# Patient Record
Sex: MALE | Race: ASIAN | NOT HISPANIC OR LATINO
[De-identification: names, ages, dates, MRNs, and addresses within clinical notes are randomized per-mention and may not be internally consistent; named-entity substitution may affect disease eponyms.]

---

## 2017-01-10 ENCOUNTER — APPOINTMENT (OUTPATIENT)
Dept: UROLOGY | Facility: CLINIC | Age: 77
End: 2017-01-10

## 2017-01-10 VITALS
HEART RATE: 61 BPM | HEIGHT: 68 IN | WEIGHT: 151 LBS | SYSTOLIC BLOOD PRESSURE: 144 MMHG | BODY MASS INDEX: 22.88 KG/M2 | DIASTOLIC BLOOD PRESSURE: 73 MMHG | TEMPERATURE: 99 F | OXYGEN SATURATION: 97 % | RESPIRATION RATE: 61 BRPM

## 2017-05-30 ENCOUNTER — APPOINTMENT (OUTPATIENT)
Dept: UROLOGY | Facility: CLINIC | Age: 77
End: 2017-05-30

## 2017-05-30 VITALS
WEIGHT: 142 LBS | SYSTOLIC BLOOD PRESSURE: 147 MMHG | TEMPERATURE: 98 F | HEART RATE: 59 BPM | DIASTOLIC BLOOD PRESSURE: 82 MMHG | HEIGHT: 68 IN | OXYGEN SATURATION: 97 % | RESPIRATION RATE: 17 BRPM | BODY MASS INDEX: 21.52 KG/M2

## 2017-06-06 ENCOUNTER — EMERGENCY (EMERGENCY)
Facility: HOSPITAL | Age: 77
LOS: 1 days | Discharge: HOME | End: 2017-06-06
Attending: EMERGENCY MEDICINE
Payer: MEDICARE

## 2017-06-06 VITALS
HEART RATE: 77 BPM | OXYGEN SATURATION: 98 % | TEMPERATURE: 98 F | SYSTOLIC BLOOD PRESSURE: 133 MMHG | RESPIRATION RATE: 18 BRPM | DIASTOLIC BLOOD PRESSURE: 63 MMHG

## 2017-06-06 VITALS
TEMPERATURE: 98 F | SYSTOLIC BLOOD PRESSURE: 148 MMHG | DIASTOLIC BLOOD PRESSURE: 75 MMHG | HEART RATE: 78 BPM | HEIGHT: 66 IN | OXYGEN SATURATION: 96 % | RESPIRATION RATE: 18 BRPM | WEIGHT: 141.1 LBS

## 2017-06-06 DIAGNOSIS — I10 ESSENTIAL (PRIMARY) HYPERTENSION: ICD-10-CM

## 2017-06-06 DIAGNOSIS — R33.9 RETENTION OF URINE, UNSPECIFIED: ICD-10-CM

## 2017-06-06 DIAGNOSIS — Z88.0 ALLERGY STATUS TO PENICILLIN: ICD-10-CM

## 2017-06-06 LAB
ANION GAP SERPL CALC-SCNC: 17 MMOL/L
APPEARANCE UR: CLEAR — SIGNIFICANT CHANGE UP
APPEARANCE: CLEAR
BACTERIA # UR AUTO: ABNORMAL /HPF
BACTERIA UR CULT: NORMAL
BACTERIA: NEGATIVE
BILIRUB UR-MCNC: NEGATIVE — SIGNIFICANT CHANGE UP
BILIRUBIN URINE: NEGATIVE
BLOOD URINE: ABNORMAL
BUN SERPL-MCNC: 18 MG/DL
CALCIUM SERPL-MCNC: 9.8 MG/DL
CHLORIDE SERPL-SCNC: 101 MMOL/L
CO2 SERPL-SCNC: 22 MMOL/L
COLOR SPEC: YELLOW — SIGNIFICANT CHANGE UP
COLOR: YELLOW
CORE LAB FLUID CYTOLOGY: NORMAL
CREAT SERPL-MCNC: 0.97 MG/DL
DIFF PNL FLD: ABNORMAL
GLUCOSE QUALITATIVE U: NORMAL MG/DL
GLUCOSE SERPL-MCNC: 97 MG/DL
GLUCOSE UR QL: NEGATIVE — SIGNIFICANT CHANGE UP
HYALINE CASTS: 2 /LPF
KETONES UR-MCNC: NEGATIVE — SIGNIFICANT CHANGE UP
KETONES URINE: NEGATIVE
LEUKOCYTE ESTERASE UR-ACNC: ABNORMAL
LEUKOCYTE ESTERASE URINE: ABNORMAL
MICROSCOPIC-UA: NORMAL
NITRITE UR-MCNC: NEGATIVE — SIGNIFICANT CHANGE UP
NITRITE URINE: NEGATIVE
PH UR: 6.5 — SIGNIFICANT CHANGE UP (ref 5–8)
PH URINE: 6.5
POTASSIUM SERPL-SCNC: 4.4 MMOL/L
PROT UR-MCNC: NEGATIVE — SIGNIFICANT CHANGE UP
PROTEIN URINE: NEGATIVE MG/DL
RBC CASTS # UR COMP ASSIST: ABNORMAL /HPF (ref 0–2)
RED BLOOD CELLS URINE: 5 /HPF
SODIUM SERPL-SCNC: 140 MMOL/L
SP GR SPEC: 1 — LOW (ref 1.01–1.02)
SPECIFIC GRAVITY URINE: 1.02
SQUAMOUS EPITHELIAL CELLS: 2 /HPF
UROBILINOGEN FLD QL: NEGATIVE — SIGNIFICANT CHANGE UP
UROBILINOGEN URINE: NORMAL MG/DL
WBC UR QL: SIGNIFICANT CHANGE UP /HPF (ref 0–5)
WHITE BLOOD CELLS URINE: 15 /HPF

## 2017-06-06 PROCEDURE — 99283 EMERGENCY DEPT VISIT LOW MDM: CPT | Mod: 25

## 2017-06-06 PROCEDURE — 81001 URINALYSIS AUTO W/SCOPE: CPT

## 2017-06-06 PROCEDURE — 51703 INSERT BLADDER CATH COMPLEX: CPT

## 2017-06-06 PROCEDURE — 99284 EMERGENCY DEPT VISIT MOD MDM: CPT | Mod: 25

## 2017-06-06 RX ORDER — TAMSULOSIN HYDROCHLORIDE 0.4 MG/1
1 CAPSULE ORAL
Qty: 7 | Refills: 0
Start: 2017-06-06 | End: 2017-06-13

## 2017-06-06 NOTE — ED ADULT NURSE NOTE - CHIEF COMPLAINT
[facility] RASHAWN  Lupe Martin    [address] [place] [address]    Phone:  [phone]    Fax:  [phone]           Patricio Pollock   MRN: [id number]    Department:  THE [facility]   Date of Visit: The patient is a 77y Male complaining of urinary/bladder trouble. [facility]  [address] [facility] [place], [address]  [phone] [facility] [address]  [address], [address]  [phone] [facility]  [address] [facility], [facility] Proc[address] Gaffney Silas 1   [phone] re-interpreted by a radiologist.  If there is a significant change in your reading, you will be contacted. Please make sure we have your correct phone number before you leave. After you leave, you should follow the attached instructions.      I have read an and ask to get set up for an insurance coverage that is in-network with [employer].         Imaging Results         XR ELBOW, COMPLETE (MIN 3 VIEWS), RIGHT (CPT=73080) (Final result) Result time:  02/25/17 12:04:38    Final result    Impression: Your unique Ghostery Access Code: [email or web address]  Expires: 4/26/2017 12:43 PM    If you have questions, you can call [phone] to talk to our [facility] Staff. Remember, Ghostery is NOT to be used for urgent needs. For medical emergencies, dial 911.

## 2017-06-06 NOTE — ED ADULT NURSE NOTE - ED STAT RN HANDOFF DETAILS 2
pt.is alert and  oriented x3.denies  pain. discharged  instruction given via   and  verbalized  understanding with chambers cath intact,left  in the ed  in stable  condition.not   in distress

## 2017-06-06 NOTE — ED PROVIDER NOTE - OBJECTIVE STATEMENT
76 y/o male with PMHx of BPH and HTN presents to the ED c/o urinary retention today. 2 weeks ago, pt noticed his urine was red. He went to his Urologist (Dr. Hemal Duque) who told him to get a CT abdomen/pelvis. Pt was unable to urinate before his CT scan this morning, was able to urinate after, and then throughout the day his urinary retention increased prompting him to visit the ED. Pt feels pain in his abdomen like he needs to urinate, but can't. Pt denies fever, chills, CP, SOB, n/v/d, or any other complaints. Allergies: Penicillin (rash).

## 2017-06-06 NOTE — ED ADULT NURSE NOTE - OBJECTIVE STATEMENT
I had a CT scan of the abdomen and bladder this morning because I saw my DR 2 weeks ago because my urine was a red color. I was able to urinate normal before scan and I urinated a lot directly after but since 1pm I have difficulty urinating.

## 2017-06-09 ENCOUNTER — APPOINTMENT (OUTPATIENT)
Dept: UROLOGY | Facility: CLINIC | Age: 77
End: 2017-06-09

## 2017-06-09 VITALS
RESPIRATION RATE: 16 BRPM | HEART RATE: 70 BPM | DIASTOLIC BLOOD PRESSURE: 69 MMHG | OXYGEN SATURATION: 96 % | SYSTOLIC BLOOD PRESSURE: 124 MMHG

## 2017-06-09 RX ORDER — CIPROFLOXACIN HYDROCHLORIDE 500 MG/1
500 TABLET, FILM COATED ORAL
Refills: 0 | Status: COMPLETED | OUTPATIENT
Start: 2017-06-09

## 2017-06-09 RX ADMIN — CIPROFLOXACIN HYDROCHLORIDE 0 MG: 500 TABLET, FILM COATED ORAL at 00:00

## 2017-06-16 ENCOUNTER — APPOINTMENT (OUTPATIENT)
Dept: UROLOGY | Facility: CLINIC | Age: 77
End: 2017-06-16

## 2017-06-16 VITALS
OXYGEN SATURATION: 96 % | SYSTOLIC BLOOD PRESSURE: 155 MMHG | WEIGHT: 138 LBS | BODY MASS INDEX: 20.98 KG/M2 | TEMPERATURE: 98.1 F | DIASTOLIC BLOOD PRESSURE: 89 MMHG | HEART RATE: 66 BPM | RESPIRATION RATE: 17 BRPM

## 2017-06-16 RX ORDER — CIPROFLOXACIN HYDROCHLORIDE 500 MG/1
500 TABLET, FILM COATED ORAL
Refills: 0 | Status: COMPLETED | OUTPATIENT
Start: 2017-06-16

## 2017-06-16 RX ADMIN — CIPROFLOXACIN HYDROCHLORIDE 0 MG: 500 TABLET, FILM COATED ORAL at 00:00

## 2017-06-19 LAB — CORE LAB FLUID CYTOLOGY: NORMAL

## 2017-09-15 ENCOUNTER — APPOINTMENT (OUTPATIENT)
Dept: UROLOGY | Facility: CLINIC | Age: 77
End: 2017-09-15
Payer: MEDICARE

## 2017-09-15 VITALS
SYSTOLIC BLOOD PRESSURE: 122 MMHG | OXYGEN SATURATION: 95 % | HEART RATE: 63 BPM | WEIGHT: 135 LBS | RESPIRATION RATE: 18 BRPM | TEMPERATURE: 98 F | BODY MASS INDEX: 20.53 KG/M2 | DIASTOLIC BLOOD PRESSURE: 73 MMHG

## 2017-09-15 PROCEDURE — 99214 OFFICE O/P EST MOD 30 MIN: CPT

## 2017-10-17 ENCOUNTER — APPOINTMENT (OUTPATIENT)
Dept: CARDIOLOGY | Facility: CLINIC | Age: 77
End: 2017-10-17
Payer: MEDICARE

## 2017-10-17 ENCOUNTER — NON-APPOINTMENT (OUTPATIENT)
Age: 77
End: 2017-10-17

## 2017-10-17 VITALS
TEMPERATURE: 98.7 F | WEIGHT: 135 LBS | BODY MASS INDEX: 20.53 KG/M2 | DIASTOLIC BLOOD PRESSURE: 69 MMHG | RESPIRATION RATE: 17 BRPM | SYSTOLIC BLOOD PRESSURE: 117 MMHG | OXYGEN SATURATION: 95 % | HEART RATE: 64 BPM

## 2017-10-17 PROCEDURE — 93306 TTE W/DOPPLER COMPLETE: CPT

## 2017-10-17 PROCEDURE — 99214 OFFICE O/P EST MOD 30 MIN: CPT | Mod: 25

## 2017-10-17 PROCEDURE — 93000 ELECTROCARDIOGRAM COMPLETE: CPT | Mod: 59

## 2017-10-17 PROCEDURE — 93015 CV STRESS TEST SUPVJ I&R: CPT

## 2017-12-13 ENCOUNTER — APPOINTMENT (OUTPATIENT)
Dept: CARDIOLOGY | Facility: CLINIC | Age: 77
End: 2017-12-13
Payer: MEDICARE

## 2017-12-13 VITALS
TEMPERATURE: 97.7 F | RESPIRATION RATE: 17 BRPM | WEIGHT: 136 LBS | HEART RATE: 67 BPM | BODY MASS INDEX: 20.68 KG/M2 | DIASTOLIC BLOOD PRESSURE: 72 MMHG | SYSTOLIC BLOOD PRESSURE: 135 MMHG | OXYGEN SATURATION: 96 %

## 2017-12-13 PROCEDURE — 99214 OFFICE O/P EST MOD 30 MIN: CPT

## 2017-12-15 ENCOUNTER — APPOINTMENT (OUTPATIENT)
Dept: UROLOGY | Facility: CLINIC | Age: 77
End: 2017-12-15
Payer: MEDICARE

## 2017-12-15 VITALS
DIASTOLIC BLOOD PRESSURE: 68 MMHG | OXYGEN SATURATION: 95 % | RESPIRATION RATE: 17 BRPM | BODY MASS INDEX: 20.53 KG/M2 | HEART RATE: 63 BPM | WEIGHT: 135 LBS | SYSTOLIC BLOOD PRESSURE: 137 MMHG | TEMPERATURE: 97.7 F

## 2017-12-15 PROCEDURE — 99214 OFFICE O/P EST MOD 30 MIN: CPT

## 2017-12-20 LAB
APPEARANCE: CLEAR
BACTERIA UR CULT: NORMAL
BACTERIA: ABNORMAL
BILIRUBIN URINE: NEGATIVE
BLOOD URINE: ABNORMAL
COLOR: YELLOW
GLUCOSE QUALITATIVE U: NEGATIVE MG/DL
HYALINE CASTS: 0 /LPF
KETONES URINE: NEGATIVE
LEUKOCYTE ESTERASE URINE: ABNORMAL
MICROSCOPIC-UA: NORMAL
NITRITE URINE: NEGATIVE
PH URINE: 5.5
PROTEIN URINE: NEGATIVE MG/DL
RED BLOOD CELLS URINE: 15 /HPF
SPECIFIC GRAVITY URINE: 1.02
SQUAMOUS EPITHELIAL CELLS: 3 /HPF
URINE COMMENTS: NORMAL
UROBILINOGEN URINE: NEGATIVE MG/DL
WHITE BLOOD CELLS URINE: 20 /HPF

## 2018-01-05 ENCOUNTER — APPOINTMENT (OUTPATIENT)
Dept: CARDIOLOGY | Facility: CLINIC | Age: 78
End: 2018-01-05
Payer: MEDICARE

## 2018-01-05 VITALS
RESPIRATION RATE: 17 BRPM | HEART RATE: 70 BPM | WEIGHT: 135 LBS | DIASTOLIC BLOOD PRESSURE: 72 MMHG | BODY MASS INDEX: 20.53 KG/M2 | TEMPERATURE: 98.3 F | OXYGEN SATURATION: 96 % | SYSTOLIC BLOOD PRESSURE: 134 MMHG

## 2018-01-05 PROCEDURE — 99214 OFFICE O/P EST MOD 30 MIN: CPT

## 2018-01-10 ENCOUNTER — RESULT REVIEW (OUTPATIENT)
Age: 78
End: 2018-01-10

## 2018-02-16 ENCOUNTER — APPOINTMENT (OUTPATIENT)
Dept: UROLOGY | Facility: CLINIC | Age: 78
End: 2018-02-16
Payer: MEDICARE

## 2018-02-16 VITALS
TEMPERATURE: 98.4 F | SYSTOLIC BLOOD PRESSURE: 136 MMHG | OXYGEN SATURATION: 96 % | BODY MASS INDEX: 20.98 KG/M2 | RESPIRATION RATE: 17 BRPM | WEIGHT: 138 LBS | HEART RATE: 71 BPM | DIASTOLIC BLOOD PRESSURE: 76 MMHG

## 2018-02-16 PROCEDURE — 99214 OFFICE O/P EST MOD 30 MIN: CPT

## 2018-02-16 RX ORDER — CIPROFLOXACIN HYDROCHLORIDE 500 MG/1
500 TABLET, FILM COATED ORAL
Qty: 10 | Refills: 0 | Status: COMPLETED | COMMUNITY
Start: 2017-12-15 | End: 2018-02-16

## 2018-02-24 LAB
APPEARANCE: ABNORMAL
BACTERIA UR CULT: NORMAL
BACTERIA: NEGATIVE
BILIRUBIN URINE: NEGATIVE
BLOOD URINE: NEGATIVE
C TRACH RRNA SPEC QL NAA+PROBE: NOT DETECTED
COLOR: YELLOW
GLUCOSE QUALITATIVE U: NEGATIVE MG/DL
HYALINE CASTS: 2 /LPF
KETONES URINE: NEGATIVE
LEUKOCYTE ESTERASE URINE: NEGATIVE
MICROSCOPIC-UA: NORMAL
N GONORRHOEA RRNA SPEC QL NAA+PROBE: NOT DETECTED
NITRITE URINE: NEGATIVE
PH URINE: 7
PROTEIN URINE: NEGATIVE MG/DL
RED BLOOD CELLS URINE: 4 /HPF
SOURCE AMPLIFICATION: NORMAL
SPECIFIC GRAVITY URINE: 1.02
SQUAMOUS EPITHELIAL CELLS: 1 /HPF
UROBILINOGEN URINE: NEGATIVE MG/DL
WHITE BLOOD CELLS URINE: 6 /HPF

## 2018-05-05 ENCOUNTER — NON-APPOINTMENT (OUTPATIENT)
Age: 78
End: 2018-05-05

## 2018-05-05 ENCOUNTER — FORM ENCOUNTER (OUTPATIENT)
Age: 78
End: 2018-05-05

## 2018-05-05 ENCOUNTER — APPOINTMENT (OUTPATIENT)
Dept: CARDIOLOGY | Facility: CLINIC | Age: 78
End: 2018-05-05
Payer: MEDICARE

## 2018-05-05 VITALS
TEMPERATURE: 98.2 F | SYSTOLIC BLOOD PRESSURE: 137 MMHG | OXYGEN SATURATION: 97 % | HEART RATE: 82 BPM | BODY MASS INDEX: 20.22 KG/M2 | RESPIRATION RATE: 18 BRPM | DIASTOLIC BLOOD PRESSURE: 69 MMHG | WEIGHT: 133 LBS

## 2018-05-05 DIAGNOSIS — R00.2 PALPITATIONS: ICD-10-CM

## 2018-05-05 PROCEDURE — 99214 OFFICE O/P EST MOD 30 MIN: CPT

## 2018-05-05 PROCEDURE — 93000 ELECTROCARDIOGRAM COMPLETE: CPT | Mod: 59

## 2018-05-05 PROCEDURE — 93225 XTRNL ECG REC<48 HRS REC: CPT

## 2018-05-05 PROCEDURE — 93227 XTRNL ECG REC<48 HR R&I: CPT

## 2018-05-17 ENCOUNTER — RESULT REVIEW (OUTPATIENT)
Age: 78
End: 2018-05-17

## 2018-05-25 ENCOUNTER — APPOINTMENT (OUTPATIENT)
Dept: CARDIOLOGY | Facility: CLINIC | Age: 78
End: 2018-05-25
Payer: MEDICARE

## 2018-05-25 PROCEDURE — 93015 CV STRESS TEST SUPVJ I&R: CPT

## 2018-05-25 PROCEDURE — A9500: CPT

## 2018-05-25 PROCEDURE — 78452 HT MUSCLE IMAGE SPECT MULT: CPT

## 2018-05-29 ENCOUNTER — RESULT REVIEW (OUTPATIENT)
Age: 78
End: 2018-05-29

## 2018-06-16 ENCOUNTER — APPOINTMENT (OUTPATIENT)
Dept: CARDIOLOGY | Facility: CLINIC | Age: 78
End: 2018-06-16
Payer: MEDICARE

## 2018-06-16 VITALS
DIASTOLIC BLOOD PRESSURE: 69 MMHG | SYSTOLIC BLOOD PRESSURE: 113 MMHG | RESPIRATION RATE: 18 BRPM | OXYGEN SATURATION: 98 % | WEIGHT: 128 LBS | TEMPERATURE: 98 F | HEART RATE: 65 BPM | BODY MASS INDEX: 19.46 KG/M2

## 2018-06-16 PROCEDURE — 99213 OFFICE O/P EST LOW 20 MIN: CPT

## 2018-08-03 ENCOUNTER — APPOINTMENT (OUTPATIENT)
Dept: UROLOGY | Facility: CLINIC | Age: 78
End: 2018-08-03
Payer: MEDICARE

## 2018-08-03 VITALS
OXYGEN SATURATION: 96 % | WEIGHT: 131 LBS | SYSTOLIC BLOOD PRESSURE: 131 MMHG | BODY MASS INDEX: 19.92 KG/M2 | HEART RATE: 67 BPM | DIASTOLIC BLOOD PRESSURE: 69 MMHG | RESPIRATION RATE: 18 BRPM | TEMPERATURE: 98 F

## 2018-08-03 PROCEDURE — 99214 OFFICE O/P EST MOD 30 MIN: CPT

## 2018-11-17 ENCOUNTER — APPOINTMENT (OUTPATIENT)
Dept: CARDIOLOGY | Facility: CLINIC | Age: 78
End: 2018-11-17
Payer: MEDICARE

## 2018-11-17 VITALS
BODY MASS INDEX: 20.98 KG/M2 | DIASTOLIC BLOOD PRESSURE: 66 MMHG | SYSTOLIC BLOOD PRESSURE: 105 MMHG | HEART RATE: 62 BPM | TEMPERATURE: 98.7 F | OXYGEN SATURATION: 95 % | RESPIRATION RATE: 18 BRPM | WEIGHT: 138 LBS

## 2018-11-17 DIAGNOSIS — R42 DIZZINESS AND GIDDINESS: ICD-10-CM

## 2018-11-17 PROCEDURE — 99214 OFFICE O/P EST MOD 30 MIN: CPT

## 2018-11-17 RX ORDER — LOSARTAN POTASSIUM 25 MG/1
25 TABLET, FILM COATED ORAL DAILY
Qty: 30 | Refills: 5 | Status: ACTIVE | COMMUNITY
Start: 2018-11-17 | End: 1900-01-01

## 2018-11-19 NOTE — HISTORY OF PRESENT ILLNESS
[FreeTextEntry1] : 78 year old male returns for followup. Patient reports low blood pressure of 110 and feeling dizzy. Patient reports feeling better in the afternoon when his BP is at 120. Patient has been taking twice the BPH medication since last year which can also affect his BP. Will reduce his losartan from 100mg to 75mg for now. (wanted to reduce to 50mg, but patient perfer to take 75mg for now)

## 2018-11-19 NOTE — PHYSICAL EXAM
[General Appearance - Well Developed] : well developed [Normal Appearance] : normal appearance [Well Groomed] : well groomed [General Appearance - Well Nourished] : well nourished [No Deformities] : no deformities [General Appearance - In No Acute Distress] : no acute distress [Normal Conjunctiva] : the conjunctiva exhibited no abnormalities [Eyelids - No Xanthelasma] : the eyelids demonstrated no xanthelasmas [Normal Oral Mucosa] : normal oral mucosa [No Oral Pallor] : no oral pallor [No Oral Cyanosis] : no oral cyanosis [Normal Jugular Venous A Waves Present] : normal jugular venous A waves present [Normal Jugular Venous V Waves Present] : normal jugular venous V waves present [No Jugular Venous Maynard A Waves] : no jugular venous maynard A waves [Respiration, Rhythm And Depth] : normal respiratory rhythm and effort [Exaggerated Use Of Accessory Muscles For Inspiration] : no accessory muscle use [Auscultation Breath Sounds / Voice Sounds] : lungs were clear to auscultation bilaterally [Heart Rate And Rhythm] : heart rate and rhythm were normal [Heart Sounds] : normal S1 and S2 [Murmurs] : no murmurs present [Abdomen Soft] : soft [Abdomen Tenderness] : non-tender [Abdomen Mass (___ Cm)] : no abdominal mass palpated [Abnormal Walk] : normal gait [Gait - Sufficient For Exercise Testing] : the gait was sufficient for exercise testing [Nail Clubbing] : no clubbing of the fingernails [Cyanosis, Localized] : no localized cyanosis [Petechial Hemorrhages (___cm)] : no petechial hemorrhages [] : no ischemic changes [Oriented To Time, Place, And Person] : oriented to person, place, and time [Affect] : the affect was normal [Mood] : the mood was normal [No Anxiety] : not feeling anxious

## 2018-11-24 PROBLEM — R42 DIZZINESS: Status: ACTIVE | Noted: 2017-12-26

## 2019-02-08 ENCOUNTER — APPOINTMENT (OUTPATIENT)
Dept: UROLOGY | Facility: CLINIC | Age: 79
End: 2019-02-08
Payer: MEDICARE

## 2019-02-08 VITALS
OXYGEN SATURATION: 95 % | WEIGHT: 136 LBS | BODY MASS INDEX: 20.68 KG/M2 | DIASTOLIC BLOOD PRESSURE: 71 MMHG | RESPIRATION RATE: 18 BRPM | HEART RATE: 70 BPM | TEMPERATURE: 97.7 F | SYSTOLIC BLOOD PRESSURE: 127 MMHG

## 2019-02-08 PROCEDURE — 99214 OFFICE O/P EST MOD 30 MIN: CPT | Mod: 25

## 2019-02-08 PROCEDURE — 76775 US EXAM ABDO BACK WALL LIM: CPT

## 2019-02-10 NOTE — LETTER BODY
[FreeTextEntry1] : Deepa Ch MD\par [address] [facility]\[place] [address]\par [phone]\par [phone]\par \par Dear Dr. Ch,\par \par Reason for visit: BPH. Urinary retention. Gross hematuria. \par \par This is a 79 year-old gentleman with cataracts, chronic BPH and history of bladder stone, presenting with initial gross hematuria and previous urinary retention. His CT scan in 2017 demonstrated an enlarged prostate, small renal cysts, and bladder stone measuring up to 3 mm. His cystoscopy in 2017 was negative. The patient returns today for follow up. Since his last visit, the patient reports taking Proscar and Flomax BID regularly without any side effects or difficulties. He reports stable uroflow with medication. Patient denies any urinary retention or hematuria or changes in health. Patient has no pain. The past medical history and family history and social history are unchanged. All other review of systems are negative. Patient denies any changes in medications. Medication list was reconciled.\par \par On examination, the patient is an older-appearing gentleman in no acute distress. He is alert and oriented follows commands. He has normal mood and affect. He is normocephalic. Oral no thrush. Neck is supple. Respirations are unlabored. His abdomen is soft and nontender. Liver is nonpalpable. Bladder is nonpalpable. No CVA tenderness. Neurologically he is grossly intact. No peripheral edema. Skin without gross abnormality. He has normal male external genitalia. Normal meatus. Bilateral testes are descended intrascrotally and normal to palpation. On rectal examination, there is normal sphincter tone. The prostate is clinically benign without focal induration or nodularity. \par \par Assessment: BPH, symptoms stable with Proscar and Flomax BID. Initial hematuria.\par \par I counseled the patient. In terms of his BPH, the patient reports stable urinary symptoms. I renewed his prescription for Flomax and Proscar and encouraged him to continue medications to relieve his urinary symptoms. In terms of his gross hematuria, I reassured the patient. His hematuria evaluation last year was negative. Patient requested renal ultrasound for renal stone surveillance. Risks and alternatives were discussed. I answered the patient's questions. The patient will follow up as directed and will contact me with any questions or concerns. Patient understands that if he develops any urinary discomfort, he will contact me for further evaluation. Thank you for the opportunity to participate in the care of Mr. DONALD. I will keep you updated on his progress.\par \par Plan: Continue Flomax BID and Proscar. Renal ultrasound. Follow up in 1 year.\par \par I personally reviewed renal ultrasound with the patient today. Images demonstrated a nonvascular right cortical cyst measuring 0.6 cm and a nonvascular left renal cyst measuring 1.3 cm. Both kidneys appear normal in size and echogenicity. There was no stones, solid masses or hydronephrosis visualized.\par

## 2019-02-10 NOTE — ADDENDUM
[FreeTextEntry1] : Entered by Shwetha Kent, acting as scribe for Dr. Hemal Edouard.\par \par The documentation recorded by the scribe accurately reflects the service I personally performed and the decisions made by me.

## 2019-05-29 ENCOUNTER — APPOINTMENT (OUTPATIENT)
Dept: CARDIOLOGY | Facility: CLINIC | Age: 79
End: 2019-05-29
Payer: MEDICARE

## 2019-05-29 ENCOUNTER — NON-APPOINTMENT (OUTPATIENT)
Age: 79
End: 2019-05-29

## 2019-05-29 VITALS
SYSTOLIC BLOOD PRESSURE: 106 MMHG | WEIGHT: 138 LBS | HEART RATE: 64 BPM | RESPIRATION RATE: 18 BRPM | OXYGEN SATURATION: 97 % | DIASTOLIC BLOOD PRESSURE: 65 MMHG | BODY MASS INDEX: 20.98 KG/M2

## 2019-05-29 PROCEDURE — 93306 TTE W/DOPPLER COMPLETE: CPT

## 2019-05-29 PROCEDURE — 93000 ELECTROCARDIOGRAM COMPLETE: CPT | Mod: 59

## 2019-05-29 PROCEDURE — 99214 OFFICE O/P EST MOD 30 MIN: CPT | Mod: 25

## 2019-05-29 PROCEDURE — 93015 CV STRESS TEST SUPVJ I&R: CPT

## 2019-05-29 NOTE — HISTORY OF PRESENT ILLNESS
[FreeTextEntry1] : Patient's BP is low at 106/69. He reports that his HR gets to 140 after 2 minutes of exercise and he is worried that it is not safe to exercise. Assured patient that he could continue to exercise and that its good for him. His BP is low at 106/65. We will decrease Losartan to 50 mg. He is on 2 BPH medications. Patient does feel dizzy when he sits up. I advised patient to undergo an echocardiogram and a treadmill stress test.

## 2019-07-11 ENCOUNTER — APPOINTMENT (OUTPATIENT)
Dept: UROLOGY | Facility: CLINIC | Age: 79
End: 2019-07-11

## 2019-07-16 ENCOUNTER — APPOINTMENT (OUTPATIENT)
Dept: UROLOGY | Facility: CLINIC | Age: 79
End: 2019-07-16
Payer: MEDICARE

## 2019-07-16 VITALS
DIASTOLIC BLOOD PRESSURE: 68 MMHG | RESPIRATION RATE: 17 BRPM | WEIGHT: 137 LBS | OXYGEN SATURATION: 96 % | HEART RATE: 78 BPM | SYSTOLIC BLOOD PRESSURE: 123 MMHG | TEMPERATURE: 98.5 F | BODY MASS INDEX: 20.83 KG/M2

## 2019-07-16 LAB
PSA FREE FLD-MCNC: 26 %
PSA FREE SERPL-MCNC: 0.78 NG/ML
PSA SERPL-MCNC: 3.02 NG/ML

## 2019-07-16 PROCEDURE — 99214 OFFICE O/P EST MOD 30 MIN: CPT

## 2019-07-16 NOTE — LETTER BODY
[FreeTextEntry1] : Deepa Ch MD\par [address] [facility]\[place] [address]\par [phone]\par [phone]\par \par Dear Dr. Ch,\par \par Reason for visit: BPH. Urinary retention. Gross hematuria. \par \par This is a 79 year-old gentleman with cataracts, chronic BPH and history of bladder stone, presenting with initial gross hematuria and previous urinary retention. His CT scan in 2017 demonstrated an enlarged prostate, small renal cysts, and bladder stone measuring up to 3 mm. His cystoscopy in 2017 was negative. The patient returns today for follow up. Since his last visit, the patient is doing well. He reports taking Proscar and Flomax BID regularly without any side effects or difficulties. He reports stable urinary symptoms with medication. Patient denies any urinary retention or hematuria or changes in health. Patient has no pain. The past medical history and family history and social history are unchanged. All other review of systems are negative. Patient denies any changes in medications. Medication list was reconciled.\par \par On examination, the patient is an older-appearing gentleman in no acute distress. He is alert and oriented follows commands. He has normal mood and affect. He is normocephalic. Oral no thrush. Neck is supple. Respirations are unlabored. His abdomen is soft and nontender. Liver is nonpalpable. Bladder is nonpalpable. No CVA tenderness. Neurologically he is grossly intact. No peripheral edema. Skin without gross abnormality. He has normal male external genitalia. Normal meatus. Bilateral testes are descended intrascrotally and normal to palpation. On rectal examination, there is normal sphincter tone. The prostate is clinically benign without focal induration or nodularity. \par \par Assessment: BPH, symptoms stable with Proscar and Flomax BID. Initial hematuria, resolved.\par \par I counseled the patient. In terms of his BPH, the patient reports stable urinary symptoms. I encourag him to continue medications to relieve his urinary symptoms. In terms of his gross hematuria, it is resolved. He will obtain urine culture and urine cytology today to ensure stability. He will also obtain BMP and PSA today. Risks and alternatives were discussed. I answered the patient's questions. The patient will follow up as directed and will contact me with any questions or concerns. Patient understands that if he develops any urinary discomfort, he will contact me for further evaluation. Thank you for the opportunity to participate in the care of Mr. DONALD. I will keep you updated on his progress.\par \par Plan: Continue Flomax BID and Proscar. BMP. PSA. Culture. Urine cytology. Follow up in 1 year.

## 2019-07-16 NOTE — ADDENDUM
[FreeTextEntry1] : Entered by Irineo Moran, acting as scribe for Dr. Hemal Edouard.\par \par The documentation recorded by the scribe accurately reflects the service I personally performed and the decisions made by me.

## 2019-07-18 LAB
ANION GAP SERPL CALC-SCNC: 15 MMOL/L
BACTERIA UR CULT: NORMAL
BUN SERPL-MCNC: 24 MG/DL
CALCIUM SERPL-MCNC: 10.1 MG/DL
CHLORIDE SERPL-SCNC: 104 MMOL/L
CO2 SERPL-SCNC: 24 MMOL/L
CREAT SERPL-MCNC: 1.08 MG/DL
GLUCOSE SERPL-MCNC: 111 MG/DL
POTASSIUM SERPL-SCNC: 4.2 MMOL/L
SODIUM SERPL-SCNC: 143 MMOL/L
URINE CYTOLOGY: NORMAL

## 2019-08-06 ENCOUNTER — APPOINTMENT (OUTPATIENT)
Dept: UROLOGY | Facility: CLINIC | Age: 79
End: 2019-08-06
Payer: MEDICARE

## 2019-08-06 VITALS
SYSTOLIC BLOOD PRESSURE: 136 MMHG | HEART RATE: 74 BPM | DIASTOLIC BLOOD PRESSURE: 73 MMHG | RESPIRATION RATE: 17 BRPM | OXYGEN SATURATION: 95 % | BODY MASS INDEX: 20.98 KG/M2 | TEMPERATURE: 97.7 F | WEIGHT: 138 LBS

## 2019-08-06 PROCEDURE — 52000 CYSTOURETHROSCOPY: CPT

## 2019-08-06 RX ORDER — CIPROFLOXACIN HYDROCHLORIDE 500 MG/1
500 TABLET, FILM COATED ORAL
Refills: 0 | Status: COMPLETED | OUTPATIENT
Start: 2019-08-06

## 2019-08-06 RX ADMIN — CIPROFLOXACIN HYDROCHLORIDE 0 MG: 500 TABLET, FILM COATED ORAL at 00:00

## 2019-08-09 ENCOUNTER — APPOINTMENT (OUTPATIENT)
Dept: UROLOGY | Facility: CLINIC | Age: 79
End: 2019-08-09

## 2019-08-10 LAB — URINE CYTOLOGY: NORMAL

## 2019-08-14 ENCOUNTER — EMERGENCY (EMERGENCY)
Facility: HOSPITAL | Age: 79
LOS: 1 days | Discharge: HOME | End: 2019-08-14
Attending: EMERGENCY MEDICINE
Payer: MEDICARE

## 2019-08-14 VITALS
TEMPERATURE: 98 F | HEIGHT: 68 IN | WEIGHT: 175.05 LBS | DIASTOLIC BLOOD PRESSURE: 83 MMHG | HEART RATE: 79 BPM | OXYGEN SATURATION: 97 % | SYSTOLIC BLOOD PRESSURE: 161 MMHG | RESPIRATION RATE: 17 BRPM

## 2019-08-14 LAB
APPEARANCE UR: ABNORMAL
BILIRUB UR-MCNC: NEGATIVE — SIGNIFICANT CHANGE UP
COLOR SPEC: ABNORMAL
DIFF PNL FLD: ABNORMAL
GLUCOSE UR QL: NEGATIVE — SIGNIFICANT CHANGE UP
KETONES UR-MCNC: NEGATIVE — SIGNIFICANT CHANGE UP
LEUKOCYTE ESTERASE UR-ACNC: ABNORMAL
NITRITE UR-MCNC: NEGATIVE — SIGNIFICANT CHANGE UP
PH UR: 5 — SIGNIFICANT CHANGE UP (ref 5–8)
PROT UR-MCNC: 100
SP GR SPEC: 1.01 — SIGNIFICANT CHANGE UP (ref 1.01–1.02)
UROBILINOGEN FLD QL: NEGATIVE — SIGNIFICANT CHANGE UP

## 2019-08-14 PROCEDURE — 99283 EMERGENCY DEPT VISIT LOW MDM: CPT

## 2019-08-14 PROCEDURE — 81001 URINALYSIS AUTO W/SCOPE: CPT

## 2019-08-14 PROCEDURE — 51702 INSERT TEMP BLADDER CATH: CPT

## 2019-08-14 PROCEDURE — 99283 EMERGENCY DEPT VISIT LOW MDM: CPT | Mod: 25

## 2019-08-14 PROCEDURE — 87086 URINE CULTURE/COLONY COUNT: CPT

## 2019-08-14 NOTE — ED PROCEDURE NOTE - CPROC ED URIN DEVICE DETAIL1
Using strict sterile technique, an indwelling urinary device was inserted through the urethral meatus, and into the bladder (see size above).
no

## 2019-08-14 NOTE — ED PROVIDER NOTE - OBJECTIVE STATEMENT
79 yr old male with hx of BPH presents to ed c/o inability to urinate since 4p.  pt had MRI abd/pelvis with IV and had normal labs including creatinine.  pt denies any fever, n/v, back pain, trauma or changes in beds.  pain mainly at suprapubic.

## 2019-08-14 NOTE — ED PROVIDER NOTE - CARE PLAN
Principal Discharge DX:	Urinary retention due to benign prostatic hyperplasia  Secondary Diagnosis:	Hematuria

## 2019-08-14 NOTE — ED PROVIDER NOTE - PROGRESS NOTE DETAILS
Garcia: chambers draining bloody urine with clots. initially 200cc.  now over 400cc, bloody tinge.    pt has appt with urologist friday,.  chambers care instructions given  dx BPh uronary obstruction with chambers.  chambers maintained.  f/u with urologist.  hydrate.  monitor for obstructed chambers. ambulatory

## 2019-08-14 NOTE — ED ADULT NURSE NOTE - NSIMPLEMENTINTERV_GEN_ALL_ED
Implemented All Universal Safety Interventions:  [place] to call system. Call bell, personal items and telephone within reach. Instruct patient to call for assistance. Room bathroom lighting operational. Non-slip footwear when patient is off stretcher. Physically safe environment: no spills, clutter or unnecessary equipment. Stretcher in lowest position, wheels locked, appropriate side rails in place.

## 2019-08-14 NOTE — ED PROVIDER NOTE - CLINICAL SUMMARY MEDICAL DECISION MAKING FREE TEXT BOX
79 yr old male with hx of BPH presents to ed c/o inability to urinate since 4p.  pt had MRI abd/pelvis with IV and had normal labs including creatinine.  pt denies any fever, n/v, back pain, trauma or changes in beds.  pain mainly at suprapubic.     obstructive uropathy- chambers, ua, urology follow up

## 2019-08-15 VITALS
DIASTOLIC BLOOD PRESSURE: 59 MMHG | SYSTOLIC BLOOD PRESSURE: 106 MMHG | OXYGEN SATURATION: 98 % | HEART RATE: 58 BPM | RESPIRATION RATE: 18 BRPM | TEMPERATURE: 98 F

## 2019-08-16 ENCOUNTER — APPOINTMENT (OUTPATIENT)
Dept: UROLOGY | Facility: CLINIC | Age: 79
End: 2019-08-16
Payer: MEDICARE

## 2019-08-16 VITALS
TEMPERATURE: 98.6 F | OXYGEN SATURATION: 96 % | WEIGHT: 140 LBS | DIASTOLIC BLOOD PRESSURE: 69 MMHG | RESPIRATION RATE: 17 BRPM | BODY MASS INDEX: 21.29 KG/M2 | SYSTOLIC BLOOD PRESSURE: 117 MMHG | HEART RATE: 83 BPM

## 2019-08-16 LAB
CULTURE RESULTS: NO GROWTH — SIGNIFICANT CHANGE UP
SPECIMEN SOURCE: SIGNIFICANT CHANGE UP

## 2019-08-16 PROCEDURE — 52000 CYSTOURETHROSCOPY: CPT

## 2019-08-16 PROCEDURE — 99213 OFFICE O/P EST LOW 20 MIN: CPT | Mod: 25

## 2019-08-16 RX ORDER — CIPROFLOXACIN HYDROCHLORIDE 500 MG/1
500 TABLET, FILM COATED ORAL
Refills: 0 | Status: COMPLETED | OUTPATIENT
Start: 2019-08-16

## 2019-08-16 RX ORDER — CIPROFLOXACIN HYDROCHLORIDE 500 MG/1
500 TABLET, FILM COATED ORAL
Qty: 2 | Refills: 0 | Status: ACTIVE | COMMUNITY
Start: 2017-06-09

## 2019-08-16 RX ADMIN — CIPROFLOXACIN HYDROCHLORIDE 0 MG: 500 TABLET, FILM COATED ORAL at 00:00

## 2019-08-16 NOTE — LETTER BODY
[FreeTextEntry1] : Deepa Ch MD\par [address] [facility]\[place] [address]\par [phone]\par [phone]\par \par Dear Dr. Ch,\par \par Reason for visit: BPH. Urinary retention. Gross hematuria. \par \par This is a 79 year-old gentleman with cataracts, chronic BPH and history of bladder stone, presenting with initial gross hematuria and previous urinary retention. His CT scan in 2017 demonstrated an enlarged prostate, small renal cysts, and bladder stone measuring up to 3 mm. His cystoscopy in 2017 was negative. The patient returns today for follow up. Since his last visit, the patient obtained an MRI from [facility] that demonstrated bilateral renal cysts and left bladder stone. He reports taking Proscar and Flomax BID regularly without any side effects or difficulties. He reports stable urinary symptoms with medication. Patient denies any hematuria or changes in health. Patient has no pain. The past medical history and family history and social history are unchanged. All other review of systems are negative. Patient denies any changes in medications. Medication list was reconciled.\par \par On examination, the patient is an older-appearing gentleman in no acute distress. He is alert and oriented follows commands. He has normal mood and affect. He is normocephalic. Oral no thrush. Neck is supple. Respirations are unlabored. His abdomen is soft and nontender. Liver is nonpalpable. Bladder is nonpalpable. No CVA tenderness. Neurologically he is grossly intact. No peripheral edema. Skin without gross abnormality. He has normal male external genitalia. Normal meatus. Bilateral testes are descended intrascrotally and normal to palpation. On rectal examination, there is normal sphincter tone. The prostate is clinically benign without focal induration or nodularity. \par \par I personally reviewed MRI scan with the patient today. Images demonstrated no evidence of hydronephrosis. There were small bilateral renal cysts measuring up to 1.2 cm. There was a round nonenhancing filling defect in the left side of the urinary bladder on the excretory phase measuring 1 cm, which may reflect a calculus\par enlarged prostate gland\par \par Assessment: BPH, symptoms stable with Proscar and Flomax BID. Initial hematuria, resolved. Renal stones. Bladder stone.\par \par I counseled the patient. In terms of his BPH, the patient reports stable urinary symptoms. I encourage him to continue medications to relieve his urinary symptoms. In terms of his gross hematuria, it is resolved. In terms of his renal and bladder stones, he will repeat cystoscopy to evaluate for urinary outlet and ensure stability. Patient understands that if he develops worsening urinary discomfort, he will contact me for further evaluation. Risks and alternatives were discussed. I answered the patient's questions. The patient will follow up as directed and will contact me with any questions or concerns.Thank you for the opportunity to participate in the care of Mr. DONALD. I will keep you updated on his progress.\par \par Plan: Continue Flomax BID and Proscar. Cystoscopy. Follow up in 6 months.\par \par Patient was given a voiding trial today. Patient was able to void spontaneously.\par \par Post-void residual on bladder scan today was 217 cc.

## 2019-08-20 ENCOUNTER — APPOINTMENT (OUTPATIENT)
Dept: UROLOGY | Facility: CLINIC | Age: 79
End: 2019-08-20
Payer: MEDICARE

## 2019-08-20 VITALS
WEIGHT: 139 LBS | SYSTOLIC BLOOD PRESSURE: 147 MMHG | RESPIRATION RATE: 17 BRPM | OXYGEN SATURATION: 95 % | HEART RATE: 88 BPM | TEMPERATURE: 98 F | BODY MASS INDEX: 21.13 KG/M2 | DIASTOLIC BLOOD PRESSURE: 83 MMHG

## 2019-08-20 DIAGNOSIS — R31.0 GROSS HEMATURIA: ICD-10-CM

## 2019-08-20 DIAGNOSIS — R33.9 RETENTION OF URINE, UNSPECIFIED: ICD-10-CM

## 2019-08-20 PROCEDURE — 51702 INSERT TEMP BLADDER CATH: CPT

## 2019-08-20 PROCEDURE — 99214 OFFICE O/P EST MOD 30 MIN: CPT | Mod: 25

## 2019-08-21 LAB
ANION GAP SERPL CALC-SCNC: 12 MMOL/L
APTT BLD: 32.1 SEC
BASOPHILS # BLD AUTO: 0.01 K/UL
BASOPHILS NFR BLD AUTO: 0.2 %
BUN SERPL-MCNC: 14 MG/DL
CALCIUM SERPL-MCNC: 9.5 MG/DL
CHLORIDE SERPL-SCNC: 104 MMOL/L
CO2 SERPL-SCNC: 25 MMOL/L
CREAT SERPL-MCNC: 0.83 MG/DL
EOSINOPHIL # BLD AUTO: 0.25 K/UL
EOSINOPHIL NFR BLD AUTO: 4.8 %
GLUCOSE SERPL-MCNC: 106 MG/DL
HCT VFR BLD CALC: 38.8 %
HGB BLD-MCNC: 12.6 G/DL
IMM GRANULOCYTES NFR BLD AUTO: 0.2 %
INR PPP: 0.98 RATIO
LYMPHOCYTES # BLD AUTO: 0.96 K/UL
LYMPHOCYTES NFR BLD AUTO: 18.3 %
MAN DIFF?: NORMAL
MCHC RBC-ENTMCNC: 29.9 PG
MCHC RBC-ENTMCNC: 32.5 GM/DL
MCV RBC AUTO: 91.9 FL
MONOCYTES # BLD AUTO: 0.49 K/UL
MONOCYTES NFR BLD AUTO: 9.3 %
NEUTROPHILS # BLD AUTO: 3.54 K/UL
NEUTROPHILS NFR BLD AUTO: 67.2 %
PLATELET # BLD AUTO: 124 K/UL
POTASSIUM SERPL-SCNC: 4.6 MMOL/L
PT BLD: 11.1 SEC
RBC # BLD: 4.22 M/UL
RBC # FLD: 14.1 %
SODIUM SERPL-SCNC: 141 MMOL/L
WBC # FLD AUTO: 5.26 K/UL

## 2019-08-23 ENCOUNTER — APPOINTMENT (OUTPATIENT)
Dept: CARDIOLOGY | Facility: CLINIC | Age: 79
End: 2019-08-23
Payer: MEDICARE

## 2019-08-23 ENCOUNTER — NON-APPOINTMENT (OUTPATIENT)
Age: 79
End: 2019-08-23

## 2019-08-23 VITALS
SYSTOLIC BLOOD PRESSURE: 142 MMHG | RESPIRATION RATE: 17 BRPM | TEMPERATURE: 98.1 F | OXYGEN SATURATION: 94 % | WEIGHT: 136 LBS | DIASTOLIC BLOOD PRESSURE: 76 MMHG | HEART RATE: 70 BPM | BODY MASS INDEX: 20.68 KG/M2

## 2019-08-23 DIAGNOSIS — Z01.810 ENCOUNTER FOR PREPROCEDURAL CARDIOVASCULAR EXAMINATION: ICD-10-CM

## 2019-08-23 DIAGNOSIS — I10 ESSENTIAL (PRIMARY) HYPERTENSION: ICD-10-CM

## 2019-08-23 LAB — BACTERIA UR CULT: ABNORMAL

## 2019-08-23 PROCEDURE — 99214 OFFICE O/P EST MOD 30 MIN: CPT

## 2019-08-23 PROCEDURE — 93000 ELECTROCARDIOGRAM COMPLETE: CPT

## 2019-08-23 RX ORDER — SULFAMETHOXAZOLE AND TRIMETHOPRIM 800; 160 MG/1; MG/1
800-160 TABLET ORAL
Qty: 10 | Refills: 0 | Status: ACTIVE | COMMUNITY
Start: 2019-08-23 | End: 1900-01-01

## 2019-08-25 NOTE — DISCUSSION/SUMMARY
[FreeTextEntry1] : 78-year-old male with HTN and history of abnormal stress test presents for followup. He was last seen on 1/5/18 for dizziness.  He underwent a neck MRA and it was normal.  Patient reports that he has been experiencing skipped heartbeats.  Patient reports chest discomfort, described as palpitations, with exertion.  Patient denies SOB.  He is on Metoprolol ER 25 mg; he takes extra dose as needed.  He is on Losartan 100 mg for HTN.\par \par (1) CP, palpitations, h/o abnormal treadmill stress test - I advised patient to wear a Holter monitor.   I advised patient to undergo a nuclear stress test.  I will obtain insurance authorization (CP, abnl treadmill stress test, needs imaging study). \par \par (2) Follow-up - pending tests.

## 2019-08-25 NOTE — REASON FOR VISIT
[Follow-Up - Clinic] : a clinic follow-up of [Hypertension] : hypertension [FreeTextEntry1] : Abnormal stress test

## 2019-08-25 NOTE — HISTORY OF PRESENT ILLNESS
[FreeTextEntry1] : Patient has chronic BPH and bladder stone and reports he underwent MRI due to hematuria. Patient reports that for the past year after he had US or CT scan he had problem with urinary retention and needed Birmingham Catheter. Patient will be undergoing laser treatment for BPH. He is on Losartan 75 mg and his BP is slightly elevated. We will adjust medication after his surgery.  \par \par \par 78-year-old male with HTN and history of abnormal stress test presents for followup. He was last seen on 5/29/19.\par \par Patient's BP is low at 106/69. He reports that his HR gets to 140 after 2 minutes of exercise and he is worried that it is not safe to exercise. Assured patient that he could continue to exercise and that its good for him. His BP is low at 106/65. We will decrease Losartan to 50 mg. He is on 2 BPH medications. Patient does feel dizzy when he sits up. I advised patient to undergo an echocardiogram and a treadmill stress test. \par \par (1) CP, palpitations, h/o abnormal treadmill stress test - I advised patient to wear a Holter monitor.   I advised patient to undergo a nuclear stress test.  I will obtain insurance authorization (CP, abnl treadmill stress test, needs imaging study). \par \par (2) Follow-up - pending tests. \par \par  1/5/18 for dizziness.  He underwent a neck MRA and it was normal.  Patient reports that he has been experiencing skipped heartbeats.  Patient reports chest discomfort, described as palpitations, with exertion.  Patient denies SOB.  He is on Metoprolol ER 25 mg; he takes extra dose as needed.  He is on Losartan 100 mg for HTN.

## 2019-08-25 NOTE — PHYSICAL EXAM
[General Appearance - Well Developed] : well developed [Well Groomed] : well groomed [Normal Appearance] : normal appearance [No Deformities] : no deformities [General Appearance - Well Nourished] : well nourished [Normal Conjunctiva] : the conjunctiva exhibited no abnormalities [General Appearance - In No Acute Distress] : no acute distress [Eyelids - No Xanthelasma] : the eyelids demonstrated no xanthelasmas [No Oral Pallor] : no oral pallor [Normal Jugular Venous A Waves Present] : normal jugular venous A waves present [No Oral Cyanosis] : no oral cyanosis [Normal Jugular Venous V Waves Present] : normal jugular venous V waves present [No Jugular Venous Maynard A Waves] : no jugular venous maynard A waves [Respiration, Rhythm And Depth] : normal respiratory rhythm and effort [Auscultation Breath Sounds / Voice Sounds] : lungs were clear to auscultation bilaterally [Exaggerated Use Of Accessory Muscles For Inspiration] : no accessory muscle use [Heart Rate And Rhythm] : heart rate and rhythm were normal [Murmurs] : no murmurs present [Heart Sounds] : normal S1 and S2 [Arterial Pulses Normal] : the arterial pulses were normal [Abdomen Soft] : soft [Abdomen Tenderness] : non-tender [Abdomen Mass (___ Cm)] : no abdominal mass palpated [Abnormal Walk] : normal gait [Nail Clubbing] : no clubbing of the fingernails [Petechial Hemorrhages (___cm)] : no petechial hemorrhages [Cyanosis, Localized] : no localized cyanosis [] : no ischemic changes [Oriented To Time, Place, And Person] : oriented to person, place, and time [Mood] : the mood was normal [Affect] : the affect was normal [No Anxiety] : not feeling anxious [FreeTextEntry1] : Trace-1+ edema noted.

## 2019-09-03 ENCOUNTER — APPOINTMENT (OUTPATIENT)
Dept: UROLOGY | Facility: CLINIC | Age: 79
End: 2019-09-03
Payer: MEDICARE

## 2019-09-03 VITALS
HEART RATE: 66 BPM | TEMPERATURE: 97.8 F | WEIGHT: 137 LBS | OXYGEN SATURATION: 96 % | DIASTOLIC BLOOD PRESSURE: 73 MMHG | SYSTOLIC BLOOD PRESSURE: 124 MMHG | RESPIRATION RATE: 17 BRPM | BODY MASS INDEX: 20.83 KG/M2

## 2019-09-03 PROCEDURE — 51798 US URINE CAPACITY MEASURE: CPT

## 2019-09-03 PROCEDURE — 99213 OFFICE O/P EST LOW 20 MIN: CPT | Mod: 25

## 2019-09-03 PROCEDURE — 51700 IRRIGATION OF BLADDER: CPT

## 2019-09-03 NOTE — LETTER BODY
[FreeTextEntry1] : Deepa Ch MD\par [address] [facility]\[place] [address]\par [phone]\par [phone]\par \par Dear Dr. Ch,\par \par Reason for visit: BPH. Urinary retention. Gross hematuria. \par \par This is a 79 year-old gentleman with cataracts, chronic BPH and history of bladder stone, presenting with initial gross hematuria and previous urinary retention. His CT scan in 2017 demonstrated an enlarged prostate, small renal cysts, and bladder stone measuring up to 3 mm. His cystoscopy in 2017 was negative. His recent MRI demonstrated bilateral renal cysts and left bladder stone. The patient returns today for voiding trial. Since his last visit, the patient reports he continues to take Proscar and Flomax BID regularly without any side effects or difficulties. He currently has a Birmingham catheter in place. Patient denies any hematuria or changes in health. Patient has no pain. The past medical history and family history and social history are unchanged. All other review of systems are negative. Patient denies any changes in medications. Medication list was reconciled.\par \par On examination, the patient is an older-appearing gentleman in no acute distress. He is alert and oriented follows commands. He has normal mood and affect. He is normocephalic. Oral no thrush. Neck is supple. Respirations are unlabored. His abdomen is soft and nontender. Liver is nonpalpable. Bladder is nonpalpable. No CVA tenderness. Neurologically he is grossly intact. No peripheral edema. Skin without gross abnormality. He has normal male external genitalia. Normal meatus. Bilateral testes are descended intrascrotally and normal to palpation. On rectal examination, there is normal sphincter tone. The prostate is clinically benign without focal induration or nodularity. \par \par Patient was given a voiding trial today. Patient was able to void spontaneously.\par \par Post-void residual on bladder scan today was 54 cc.\par \par Assessment: BPH, symptoms stable with Proscar and Flomax BID. Initial hematuria, resolved. Renal stones. Bladder stone. Urinary retention, resolved.\par \par I counseled the patient. He passed his voiding trial today with a PVR of 54 cc. In terms of his urinary retention, it appears to have resolved. I recommend he follow up in 3 days for repeat PVR  to ensure stability. Risks and alternatives were discussed. I answered the patient questions. The patient will follow-up as directed and will contact me with any questions or concerns. Thank you for the opportunity to participate in the care of Mr. DONALD. I will keep you updated on his progress.\par \par Plan: Follow up on Friday.

## 2019-09-04 ENCOUNTER — OUTPATIENT (OUTPATIENT)
Dept: OUTPATIENT SERVICES | Facility: HOSPITAL | Age: 79
LOS: 1 days | End: 2019-09-04
Payer: MEDICARE

## 2019-09-04 VITALS
OXYGEN SATURATION: 97 % | RESPIRATION RATE: 14 BRPM | DIASTOLIC BLOOD PRESSURE: 70 MMHG | HEART RATE: 71 BPM | SYSTOLIC BLOOD PRESSURE: 109 MMHG | HEIGHT: 68 IN | WEIGHT: 136.03 LBS | TEMPERATURE: 98 F

## 2019-09-04 DIAGNOSIS — Z01.818 ENCOUNTER FOR OTHER PREPROCEDURAL EXAMINATION: ICD-10-CM

## 2019-09-04 DIAGNOSIS — Z87.438 PERSONAL HISTORY OF OTHER DISEASES OF MALE GENITAL ORGANS: Chronic | ICD-10-CM

## 2019-09-04 DIAGNOSIS — N40.1 BENIGN PROSTATIC HYPERPLASIA WITH LOWER URINARY TRACT SYMPTOMS: ICD-10-CM

## 2019-09-04 DIAGNOSIS — Z98.49 CATARACT EXTRACTION STATUS, UNSPECIFIED EYE: Chronic | ICD-10-CM

## 2019-09-04 LAB
BLD GP AB SCN SERPL QL: NEGATIVE — SIGNIFICANT CHANGE UP
RH IG SCN BLD-IMP: POSITIVE — SIGNIFICANT CHANGE UP

## 2019-09-04 PROCEDURE — 87086 URINE CULTURE/COLONY COUNT: CPT

## 2019-09-04 PROCEDURE — 86900 BLOOD TYPING SEROLOGIC ABO: CPT

## 2019-09-04 PROCEDURE — 86850 RBC ANTIBODY SCREEN: CPT

## 2019-09-04 PROCEDURE — 86901 BLOOD TYPING SEROLOGIC RH(D): CPT

## 2019-09-04 PROCEDURE — G0463: CPT

## 2019-09-04 RX ORDER — FINASTERIDE 5 MG/1
1 TABLET, FILM COATED ORAL
Qty: 0 | Refills: 0 | DISCHARGE

## 2019-09-04 RX ORDER — ASPIRIN/CALCIUM CARB/MAGNESIUM 324 MG
1 TABLET ORAL
Qty: 0 | Refills: 0 | DISCHARGE

## 2019-09-04 RX ORDER — TIOTROPIUM BROMIDE AND OLODATEROL 3.124; 2.736 UG/1; UG/1
2 SPRAY, METERED RESPIRATORY (INHALATION)
Qty: 0 | Refills: 0 | DISCHARGE

## 2019-09-04 RX ORDER — TAMSULOSIN HYDROCHLORIDE 0.4 MG/1
1 CAPSULE ORAL
Qty: 0 | Refills: 0 | DISCHARGE

## 2019-09-04 RX ORDER — ALENDRONATE SODIUM 70 MG/1
1 TABLET ORAL
Qty: 0 | Refills: 0 | DISCHARGE

## 2019-09-04 RX ORDER — METOPROLOL TARTRATE 50 MG
1 TABLET ORAL
Qty: 0 | Refills: 0 | DISCHARGE

## 2019-09-04 RX ORDER — LOSARTAN POTASSIUM 100 MG/1
3 TABLET, FILM COATED ORAL
Qty: 0 | Refills: 0 | DISCHARGE

## 2019-09-04 NOTE — H&P PST ADULT - ATTENDING COMMENTS
79 year old gentleman with BPH with persistent urinary symptoms despite medical therapy. Patient wishes to proceed with Greenlight laser vaporization of prostate possible transurethral resection of prostate to treat his condition. Alternatives were given. Risks including but not limited to bleeding, need for blood transfusion, infection, risk of anesthesia, pain, failure to cure, persistent urinary symptoms, bladder neck contractures, erectile dysfunction, urinary incontinence, need for future procedure, and injury to surrounding structures were discussed. Patient wishes to proceed with procedure.

## 2019-09-04 NOTE — H&P PST ADULT - NSICDXPROBLEM_GEN_ALL_CORE_FT
PROBLEM DIAGNOSES  Problem: BPH with urinary obstruction  Assessment and Plan: Greenlight laser vaporization of prostate

## 2019-09-04 NOTE — H&P PST ADULT - ACTIVITY
walk daily 20min, able to walk up 1 flights of stairs, able to do some housework- sweep floor, wash dishes

## 2019-09-04 NOTE — H&P PST ADULT - NSANTHOSAYNRD_GEN_A_CORE
No. SLY screening performed.  STOP BANG Legend: 0-2 = LOW Risk; 3-4 = INTERMEDIATE Risk; 5-8 = HIGH Risk

## 2019-09-04 NOTE — H&P PST ADULT - NSICDXPASTSURGICALHX_GEN_ALL_CORE_FT
PAST SURGICAL HISTORY:  History of BPH laser procedure for BPH 2007    S/P cataract extraction bilateral

## 2019-09-04 NOTE — H&P PST ADULT - HISTORY OF PRESENT ILLNESS
79 yr old male with hx of BPH presents to ed c/o  inability to urinate since 4p. pt had MRI abd/pelvis with IV and had normal  labs including creatinine. pt denies any fever, n/v, back pain, trauma or  changes in beds. pain mainly at suprapubic. 79 yr old male with hx of HTN, BPH, s/p laser procedure for BPH in 2003, c/o hematuria, urinary hesitancy and occasional retention requiring chambers catheter insertion, evaluated by Dr. Edouard, now presents presents to PST scheduled for Greenlight laser vaporization of prostate. 79 yr old male with hx of asthma (well controlled), HTN, BPH, s/p laser procedure for BPH in 2003, c/o hematuria, urinary hesitancy and occasional retention requiring chambers catheter insertion, evaluated by Dr. Edouard, now presents presents to PST scheduled for Greenlight laser vaporization of prostate.   pt had positive UTI and completed course of bactrim on 8/28. repeat urine CNS sent today.

## 2019-09-06 ENCOUNTER — APPOINTMENT (OUTPATIENT)
Dept: UROLOGY | Facility: CLINIC | Age: 79
End: 2019-09-06
Payer: MEDICARE

## 2019-09-06 VITALS
RESPIRATION RATE: 17 BRPM | WEIGHT: 139 LBS | SYSTOLIC BLOOD PRESSURE: 122 MMHG | OXYGEN SATURATION: 95 % | HEART RATE: 77 BPM | TEMPERATURE: 98.7 F | BODY MASS INDEX: 21.13 KG/M2 | DIASTOLIC BLOOD PRESSURE: 69 MMHG

## 2019-09-06 LAB
CULTURE RESULTS: SIGNIFICANT CHANGE UP
SPECIMEN SOURCE: SIGNIFICANT CHANGE UP

## 2019-09-06 PROCEDURE — 51798 US URINE CAPACITY MEASURE: CPT

## 2019-09-06 PROCEDURE — 99213 OFFICE O/P EST LOW 20 MIN: CPT | Mod: 25

## 2019-09-06 NOTE — ADDENDUM
[FreeTextEntry1] : Entered by Jimbo Rahman, acting as scribe for Dr. Hemal Edouard.\par \par The documentation recorded by the scribe accurately reflects the service I personally performed and the decisions made by me.

## 2019-09-06 NOTE — LETTER BODY
[FreeTextEntry1] : Deepa Ch MD\par [address] [facility]\[place] [address]\par [phone]\par [phone]\par \par Dear Dr. Ch,\par \par Reason for visit: BPH. Urinary retention. Gross hematuria. \par \par This is a 79 year-old gentleman with cataracts, chronic BPH and history of bladder stone, presenting with initial gross hematuria and previous urinary retention. His CT scan in 2017 demonstrated an enlarged prostate, small renal cysts, and bladder stone. His cystoscopy in 2017 was negative. His recent MRI demonstrated bilateral renal cysts and left bladder stone. The patient returns today for follow up. Since his last visit, the patient reports he continues to take Proscar and Flomax BID regularly without any side effects or difficulties. Patient denies any hematuria or changes in health. Patient has no pain. The past medical history and family history and social history are unchanged. All other review of systems are negative. Patient denies any changes in medications. Medication list was reconciled.\par \par On examination, the patient is an older-appearing gentleman in no acute distress. He is alert and oriented follows commands. He has normal mood and affect. He is normocephalic. Oral no thrush. Neck is supple. Respirations are unlabored. His abdomen is soft and nontender. Liver is nonpalpable. Bladder is nonpalpable. No CVA tenderness. Neurologically he is grossly intact. No peripheral edema. Skin without gross abnormality. He has normal male external genitalia. Normal meatus. Bilateral testes are descended intrascrotally and normal to palpation. On rectal examination, there is normal sphincter tone. The prostate is clinically benign without focal induration or nodularity. \par \par Post-void residual on bladder scan today was 69 cc.\par \par Assessment: BPH, symptoms stable with Proscar and Flomax BID. Initial hematuria, resolved. Renal stones. Bladder stone. Urinary retention, resolved.\par \par I counseled the patient. He passed his voiding trial today with a PVR of 69 cc. In terms of his BPH, he will continue Flomax BID and Proscar. He will also schedule a Greenlight laser vaporization of prostate.  I counseled the patient regarding the procedure. The risks and benefits were discussed. The patient will also obtain urinalysis and culture to ensures stability. Risks and alternatives were discussed. I answered the patient questions. The patient will follow-up as directed and will contact me with any questions or concerns. Thank you for the opportunity to participate in the care of Mr. DONALD. I will keep you updated on his progress.\par \par Plan: Cont. Flomax BID and proscar. Urinalysis. Culture. Follow up as directed.

## 2019-09-10 PROBLEM — J45.909 UNSPECIFIED ASTHMA, UNCOMPLICATED: Chronic | Status: ACTIVE | Noted: 2019-09-04

## 2019-09-11 ENCOUNTER — APPOINTMENT (OUTPATIENT)
Dept: UROLOGY | Facility: HOSPITAL | Age: 79
End: 2019-09-11

## 2019-09-11 LAB
APPEARANCE: CLEAR
BACTERIA UR CULT: ABNORMAL
BACTERIA: NEGATIVE
BILIRUBIN URINE: NEGATIVE
BLOOD URINE: ABNORMAL
COLOR: YELLOW
GLUCOSE QUALITATIVE U: NEGATIVE
HYALINE CASTS: 0 /LPF
KETONES URINE: NEGATIVE
LEUKOCYTE ESTERASE URINE: NEGATIVE
MICROSCOPIC-UA: NORMAL
NITRITE URINE: NEGATIVE
PH URINE: 6
PROTEIN URINE: NORMAL
RED BLOOD CELLS URINE: 75 /HPF
SPECIFIC GRAVITY URINE: 1.03
SQUAMOUS EPITHELIAL CELLS: 1 /HPF
UROBILINOGEN URINE: NORMAL
WHITE BLOOD CELLS URINE: 14 /HPF

## 2019-09-11 RX ORDER — CEPHALEXIN 500 MG/1
500 CAPSULE ORAL 3 TIMES DAILY
Qty: 15 | Refills: 0 | Status: ACTIVE | COMMUNITY
Start: 2019-09-11 | End: 1900-01-01

## 2019-09-24 ENCOUNTER — APPOINTMENT (OUTPATIENT)
Dept: UROLOGY | Facility: CLINIC | Age: 79
End: 2019-09-24
Payer: MEDICARE

## 2019-09-24 VITALS
WEIGHT: 135 LBS | HEART RATE: 76 BPM | RESPIRATION RATE: 17 BRPM | BODY MASS INDEX: 20.53 KG/M2 | OXYGEN SATURATION: 96 % | DIASTOLIC BLOOD PRESSURE: 68 MMHG | SYSTOLIC BLOOD PRESSURE: 125 MMHG | TEMPERATURE: 98.6 F

## 2019-09-24 PROCEDURE — 99213 OFFICE O/P EST LOW 20 MIN: CPT | Mod: 25

## 2019-09-24 PROCEDURE — 51798 US URINE CAPACITY MEASURE: CPT

## 2019-09-24 NOTE — LETTER BODY
[FreeTextEntry1] : Deepa Ch MD\par [address] [facility]\[place] [address]\par [phone] [phone]\par \par Dear Dr. Ch,\par \par Reason for visit: BPH. Urinary retention. Gross hematuria. \par \par This is a 79 year-old gentleman with cataracts, chronic BPH and history of bladder stone, presenting with initial gross hematuria and previous urinary retention. His CT scan in 2017 demonstrated an enlarged prostate, small renal cysts, and bladder stone. His cystoscopy in 2017 was negative. His previous MRI demonstrated bilateral renal cysts and left bladder stone. The patient returns today for follow up. Since his last visit, the patient reports he is doing well. He denies any urinary difficulties or discomfort. He continues to take Proscar and Flomax BID regularly without any side effects or difficulties. Patient denies any hematuria or changes in health. Patient has no pain. The past medical history and family history and social history are unchanged. All other review of systems are negative. Patient denies any changes in medications. Medication list was reconciled.\par \par On examination, the patient is an older-appearing gentleman in no acute distress. He is alert and oriented follows commands. He has normal mood and affect. He is normocephalic. Oral no thrush. Neck is supple. Respirations are unlabored. His abdomen is soft and nontender. Liver is nonpalpable. Bladder is nonpalpable. No CVA tenderness. Neurologically he is grossly intact. No peripheral edema. Skin without gross abnormality. He has normal male external genitalia. Normal meatus. Bilateral testes are descended intrascrotally and normal to palpation. On rectal examination, there is normal sphincter tone. The prostate is clinically benign without focal induration or nodularity. \par \par Post-void residual on bladder scan today was 38 cc.\par \par Assessment: BPH, symptoms stable with Proscar and Flomax BID. Initial hematuria, resolved. Renal stones. Bladder stone. Urinary retention, resolved.\par \par I counseled the patient. He is doing well. His PVR today was 38 cc. In terms of his BPH, his symptoms are stable on medication. I renewed the patient's prescription for Flomax BID and Proscar today. I encouraged the patient to continue medications regularly as directed. I discussed his option of surgical intervention to prevent urinary retention in the future. I counseled the patient regarding the procedure. The risks and benefits were discussed. Alternatives were given. I answered the patient questions. The patient understands the risks and defers surgery. The patient will follow-up as directed and will contact me with any questions or concerns. Thank you for the opportunity to participate in the care of Mr. DONALD. I will keep you updated on his progress.\par \par Plan: Cont. Flomax BID and Proscar. Follow up in 3 months.

## 2019-10-02 ENCOUNTER — APPOINTMENT (OUTPATIENT)
Dept: UROLOGY | Facility: HOSPITAL | Age: 79
End: 2019-10-02

## 2019-10-18 ENCOUNTER — APPOINTMENT (OUTPATIENT)
Dept: UROLOGY | Facility: CLINIC | Age: 79
End: 2019-10-18
Payer: MEDICARE

## 2019-10-18 VITALS
BODY MASS INDEX: 21.44 KG/M2 | TEMPERATURE: 98 F | DIASTOLIC BLOOD PRESSURE: 48 MMHG | WEIGHT: 141 LBS | OXYGEN SATURATION: 96 % | HEART RATE: 70 BPM | SYSTOLIC BLOOD PRESSURE: 143 MMHG | RESPIRATION RATE: 17 BRPM

## 2019-10-18 DIAGNOSIS — R94.30 ABNORMAL RESULT OF CARDIOVASCULAR FUNCTION STUDY, UNSPECIFIED: ICD-10-CM

## 2019-10-18 PROCEDURE — 51798 US URINE CAPACITY MEASURE: CPT

## 2019-10-18 PROCEDURE — 99213 OFFICE O/P EST LOW 20 MIN: CPT | Mod: 25

## 2019-10-20 LAB
APPEARANCE: CLEAR
BACTERIA UR CULT: ABNORMAL
BACTERIA: NEGATIVE
BILIRUBIN URINE: NEGATIVE
BLOOD URINE: ABNORMAL
COLOR: YELLOW
GLUCOSE QUALITATIVE U: NEGATIVE
HYALINE CASTS: 1 /LPF
KETONES URINE: NEGATIVE
LEUKOCYTE ESTERASE URINE: ABNORMAL
MICROSCOPIC-UA: NORMAL
NITRITE URINE: NEGATIVE
PH URINE: 5.5
PROTEIN URINE: NEGATIVE
RED BLOOD CELLS URINE: 3 /HPF
SPECIFIC GRAVITY URINE: 1.02
SQUAMOUS EPITHELIAL CELLS: 1 /HPF
UROBILINOGEN URINE: NORMAL
WHITE BLOOD CELLS URINE: 5 /HPF

## 2019-10-21 NOTE — ADDENDUM
[FreeTextEntry1] : Entered by iJmbo Rahman, acting as scribe for Dr. Hemal Edouard.\par \par The documentation recorded by the scribe accurately reflects the service I personally performed and the decisions made by me.

## 2019-10-21 NOTE — LETTER BODY
[FreeTextEntry1] : Deepa Ch MD\par [address] [facility]\[place] [address]\par [phone]\par [phone]\par \par Dear Dr. Ch,\par \par Reason for visit: BPH. Urinary retention. Gross hematuria. \par \par This is a 79 year-old gentleman with cataracts, chronic BPH and history of bladder stone, presenting with initial gross hematuria and previous urinary retention. His CT scan in 2017 demonstrated an enlarged prostate, small renal cysts, and bladder stone. His cystoscopy in 2017 was unremarkable. His previous MRI demonstrated bilateral renal cysts and left bladder stone. The patient returns today for follow up. Since his last visit, patient reports an episode of gross hematuria and UTI. He denies any urinary difficulties or discomfort. He continues to take Proscar and Flomax BID regularly without any side effects or difficulties. Patient denies any hematuria or changes in health. Patient has no pain. The past medical history and family history and social history are unchanged. All other review of systems are negative. Patient denies any changes in medications. Medication list was reconciled.\par \par On examination, the patient is an older-appearing gentleman in no acute distress. He is alert and oriented follows commands. He has normal mood and affect. He is normocephalic. Oral no thrush. Neck is supple. Respirations are unlabored. His abdomen is soft and nontender. Liver is nonpalpable. Bladder is nonpalpable. No CVA tenderness. Neurologically he is grossly intact. No peripheral edema. Skin without gross abnormality. He has normal male external genitalia. Normal meatus. Bilateral testes are descended intrascrotally and normal to palpation. On rectal examination, there is normal sphincter tone. The prostate is clinically benign without focal induration or nodularity. \par \par Post-void residual on bladder scan today was 50 cc. His previous PVR was 38 cc.\par \par Assessment: BPH, symptoms stable with Proscar and Flomax BID. Initial hematuria, resolved. Renal stones. Bladder stone. Urinary retention, resolved. \par \par I counseled the patient. He is doing well. His PVR today was 50 cc. In terms of his BPH, his symptoms are stable on Flomax BID and Proscar. I encouraged the him to continue medications regularly as directed. Patient has previously deferred surgery for his urinary retention. In terms of his UTI, I recommend he obtain urine culture, urinalysis, and cytology to further evaluate. The patient will follow-up as directed and will contact me with any questions or concerns. Thank you for the opportunity to participate in the care of Mr. DONALD. I will keep you updated on his progress.\par \par Plan: Cont. Flomax BID and Proscar. Urine culture. Urinalysis. Cytology. Follow up 6 months.

## 2019-10-25 ENCOUNTER — APPOINTMENT (OUTPATIENT)
Dept: UROLOGY | Facility: CLINIC | Age: 79
End: 2019-10-25
Payer: MEDICARE

## 2019-10-25 VITALS
RESPIRATION RATE: 18 BRPM | TEMPERATURE: 97.9 F | OXYGEN SATURATION: 96 % | BODY MASS INDEX: 21.29 KG/M2 | DIASTOLIC BLOOD PRESSURE: 71 MMHG | SYSTOLIC BLOOD PRESSURE: 127 MMHG | HEART RATE: 63 BPM | WEIGHT: 140 LBS

## 2019-10-25 LAB — URINE CYTOLOGY: NORMAL

## 2019-10-25 PROCEDURE — 99213 OFFICE O/P EST LOW 20 MIN: CPT

## 2019-10-25 NOTE — LETTER BODY
[FreeTextEntry1] : \par Deepa Ch MD\par [address] [facility]\[place] [address]\par [phone]\par [phone]\par \par Dear Dr. Ch,\par \par Reason for visit: BPH. Urinary retention. Gross hematuria. \par \par This is a 79 year-old gentleman with cataracts, chronic BPH and history of bladder stone, presenting with initial gross hematuria and previous urinary retention. His CT scan in 2017 demonstrated an enlarged prostate, small renal cysts, and bladder stone. His cystoscopy in 2017 was unremarkable. His previous MRI demonstrated bilateral renal cysts and left bladder stone. The patient returns today for follow up. Since his last visit, patient's urinalysis returned unremarkable and his UTI has resolved. He denies any urinary difficulties or discomfort. He continues to take Proscar and Flomax BID regularly without any side effects or difficulties. Patient denies any hematuria or changes in health. Patient has no pain. The past medical history and family history and social history are unchanged. All other review of systems are negative. Patient denies any changes in medications. Medication list was reconciled.\par \par On examination, the patient is an older-appearing gentleman in no acute distress. He is alert and oriented follows commands. He has normal mood and affect. He is normocephalic. Oral no thrush. Neck is supple. Respirations are unlabored. His abdomen is soft and nontender. Liver is nonpalpable. Bladder is nonpalpable. No CVA tenderness. Neurologically he is grossly intact. No peripheral edema. Skin without gross abnormality. He has normal male external genitalia. Normal meatus. Bilateral testes are descended intrascrotally and normal to palpation. On rectal examination, there is normal sphincter tone. The prostate is clinically benign without focal induration or nodularity. \par \par Assessment: BPH, symptoms stable with Proscar and Flomax BID. Initial hematuria, resolved. Renal stones. Bladder stone. Urinary retention, resolved. \par \par I counseled the patient. He is doing well. In terms of his BPH, his symptoms are stable on Flomax BID and Proscar. I encouraged the him to continue medications regularly as directed. Patient has previously deferred surgery for his urinary retention. The patient will follow-up as directed and will contact me with any questions or concerns. Thank you for the opportunity to participate in the care of Mr. DONALD. I will keep you updated on his progress.\par \par Plan: Cont. Flomax BID and Proscar. Follow up 6 months. \par

## 2020-01-07 ENCOUNTER — APPOINTMENT (OUTPATIENT)
Dept: UROLOGY | Facility: CLINIC | Age: 80
End: 2020-01-07
Payer: MEDICARE

## 2020-01-07 VITALS
RESPIRATION RATE: 18 BRPM | WEIGHT: 145 LBS | OXYGEN SATURATION: 95 % | HEART RATE: 82 BPM | BODY MASS INDEX: 22.05 KG/M2 | TEMPERATURE: 98.4 F | DIASTOLIC BLOOD PRESSURE: 71 MMHG | SYSTOLIC BLOOD PRESSURE: 143 MMHG

## 2020-01-07 PROCEDURE — 99214 OFFICE O/P EST MOD 30 MIN: CPT | Mod: 25

## 2020-01-07 PROCEDURE — 51798 US URINE CAPACITY MEASURE: CPT

## 2020-01-07 NOTE — LETTER BODY
[FreeTextEntry1] : Deepa Ch MD\par [address] [facility]\[place] [address]\par [phone] [phone]\par \par Dear Dr. Ch,\par \par Reason for visit: BPH. Urinary retention. Gross hematuria. \par \par This is a 79 year-old gentleman with cataracts, chronic BPH and history of bladder stone, presenting with previous gross hematuria and urinary retention. His CT scan in 2017 demonstrated an enlarged prostate, small renal cysts, and bladder stone. His cystoscopy in 2017 was unremarkable. His previous MRI demonstrated bilateral renal cysts and left bladder stone. The patient returns today for follow up. Since his last visit, the patient continues to take Proscar and Flomax BID regularly without any side effects or difficulties. He complains of persistent nocturia and incomplete emptying of the bladder despite medical therapy. Patient denies any hematuria or urinary incontinence. He denies any changes in health. Patient has no pain. The past medical history and family history and social history are unchanged. All other review of systems are negative. Patient denies any changes in medications. Medication list was reconciled. He is allergic to Penicillins.\par \par On examination, the patient is an older-appearing gentleman in no acute distress. He is alert and oriented follows commands. He has normal mood and affect. He is normocephalic. Oral no thrush. Neck is supple. Respirations are unlabored. His abdomen is soft and nontender. Liver is nonpalpable. Bladder is nonpalpable. No CVA tenderness. Neurologically he is grossly intact. No peripheral edema. Skin without gross abnormality. He has normal male external genitalia. Normal meatus. Bilateral testes are descended intrascrotally and normal to palpation. On rectal examination, there is normal sphincter tone. The prostate is clinically benign without focal induration or nodularity. \par \par Post-void residual on bladder scan today was 45 cc. \par \par Assessment: BPH, persistent symptoms with Proscar and Flomax BID. Initial hematuria, resolved. Renal stones. Bladder stone. Urinary retention, resolved. \par \par I counseled the patient. In terms of his BPH, his symptoms have progressed. He complains of nocturia and incomplete emptying of the bladder on Flomax BID and Proscar. I recommended he consider surgical intervention to treat his symptoms. I counseled the patient regarding the procedure. The risks and benefits were discussed. Alternatives were given. I answered the patient questions. The patient has deferred surgery and requests to continue medical therapy. I renewed the patient's prescription for Flomax BID and Proscar today. I encouraged the patient to continue medications regularly as directed. The patient will follow-up as directed and will contact me with any questions or concerns. Thank you for the opportunity to participate in the care of Mr. DONALD. I will keep you updated on his progress.\par \par Plan: Cont. Flomax BID and Proscar. Follow up 3 months.

## 2020-06-16 ENCOUNTER — APPOINTMENT (OUTPATIENT)
Dept: UROLOGY | Facility: CLINIC | Age: 80
End: 2020-06-16
Payer: MEDICARE

## 2020-06-16 VITALS
BODY MASS INDEX: 20.83 KG/M2 | HEART RATE: 71 BPM | OXYGEN SATURATION: 97 % | SYSTOLIC BLOOD PRESSURE: 151 MMHG | RESPIRATION RATE: 18 BRPM | TEMPERATURE: 98.2 F | DIASTOLIC BLOOD PRESSURE: 74 MMHG | WEIGHT: 137 LBS

## 2020-06-16 PROCEDURE — 99214 OFFICE O/P EST MOD 30 MIN: CPT | Mod: 25

## 2020-06-16 PROCEDURE — 51798 US URINE CAPACITY MEASURE: CPT

## 2020-06-16 NOTE — LETTER BODY
[FreeTextEntry1] : Deepa Ch MD\par [address] [facility]\[place] [address]\par [phone]\par [phone]\par \par Dear Dr. Ch,\par \par Reason for visit: BPH. Urinary retention. Gross hematuria. \par \par This is a 80 year-old gentleman with cataracts, chronic BPH and history of bladder stone, presenting with previous gross hematuria and urinary retention. His CT scan in 2017 demonstrated an enlarged prostate, small renal cysts, and bladder stone. His cystoscopy in 2017 was unremarkable. His MRI in 2019 demonstrated bilateral renal cysts and left bladder stone. The patient returns today for follow up. Since his last visit, the patient reports he is doing well. He continues to take Proscar and Flomax BID regularly without any side effects or difficulties. He reports of stable urinary symptoms with medication. Patient denies any hematuria, dysuria, or urinary incontinence. He denies any changes in health. Patient has no pain currently. The past medical history and family history and social history are unchanged. All other review of systems are negative. Patient denies any changes in medications. Medication list was reconciled. He is allergic to Penicillins.\par \par On examination, the patient is an older-appearing gentleman in no acute distress. He is alert and oriented follows commands. He has normal mood and affect. He is normocephalic. Oral no thrush. Neck is supple. Respirations are unlabored. His abdomen is soft and nontender. Liver is nonpalpable. Bladder is nonpalpable. No CVA tenderness. Neurologically he is grossly intact. No peripheral edema. Skin without gross abnormality. He has normal male external genitalia. Normal meatus. Bilateral testes are descended intrascrotally and normal to palpation. On rectal examination, there is normal sphincter tone. The prostate is clinically benign without focal induration or nodularity. \par \par Post-void residual on bladder scan today was 57 cc. \par \par Assessment: BPH, stable symptoms with Proscar and Flomax BID. Hematuria, resolved. Renal stones. Bladder stone. Urinary retention, resolved. \par \par I counseled the patient. He is doing well. In terms of his BPH, his symptoms are stable with medical therapy. I renewed the patient's prescription for Flomax BID and Proscar today. I encouraged the patient to continue medications regularly as directed. Risks and alternatives were discussed. I answered the patient questions. The patient will follow-up as directed and will contact me with any questions or concerns. Thank you for the opportunity to participate in the care of Mr. DONALD. I will keep you updated on his progress.\par \par Plan: Continue Flomax BID and Proscar. Follow up in 1 year.

## 2020-09-15 ENCOUNTER — APPOINTMENT (OUTPATIENT)
Dept: UROLOGY | Facility: CLINIC | Age: 80
End: 2020-09-15
Payer: MEDICARE

## 2020-09-15 VITALS
RESPIRATION RATE: 18 BRPM | HEART RATE: 78 BPM | BODY MASS INDEX: 20.83 KG/M2 | DIASTOLIC BLOOD PRESSURE: 74 MMHG | TEMPERATURE: 98.2 F | WEIGHT: 137 LBS | OXYGEN SATURATION: 95 % | SYSTOLIC BLOOD PRESSURE: 141 MMHG

## 2020-09-15 DIAGNOSIS — Z87.440 PERSONAL HISTORY OF URINARY (TRACT) INFECTIONS: ICD-10-CM

## 2020-09-15 PROCEDURE — 99214 OFFICE O/P EST MOD 30 MIN: CPT | Mod: 25

## 2020-09-15 PROCEDURE — 51798 US URINE CAPACITY MEASURE: CPT

## 2020-09-15 NOTE — ADDENDUM
[FreeTextEntry1] : Entered by Kain Villanueva, acting as scribe for Dr. Hemal Edouard.\par \par The documentation recorded by the scribe accurately reflects the service I personally performed and the decisions made by me.\par

## 2020-09-15 NOTE — LETTER BODY
[FreeTextEntry1] : Deepa Ch MD\par [address] [facility]\[place] [address]\par [phone]\par [phone]\par \par Dear Dr. Ch,\par \par Reason for visit: BPH. Urinary retention. Gross hematuria. \par \par This is a 80 year-old gentleman with cataracts, chronic BPH and history of bladder stone, presenting with previous gross hematuria and urinary retention. His CT scan in 2017 demonstrated an enlarged prostate, small renal cysts, and bladder stone. His cystoscopy in 2017 was unremarkable. His MRI in 2019 demonstrated bilateral renal cysts and left bladder stone. The patient returns today for follow up. Since his last visit, patient reports that he continues to take Proscar and Flomax BID regularly without any side effects or difficulties. He is overall well. He reports of stable urinary symptoms with medical therapy. Patient denies any hematuria, dysuria, or urinary incontinence. He denies any changes in health. Patient has no pain currently. The past medical history and family history and social history are unchanged. All other review of systems are negative. Patient denies any changes in medications. Medication list was reconciled. He is allergic to Penicillins.\par \par On examination, the patient is an older-appearing gentleman in no acute distress. He is alert and oriented follows commands. He has normal mood and affect. He is normocephalic. Oral no thrush. Neck is supple. Respirations are unlabored. His abdomen is soft and nontender. Liver is nonpalpable. Bladder is nonpalpable. No CVA tenderness. Neurologically he is grossly intact. No peripheral edema. Skin without gross abnormality. He has normal male external genitalia. Normal meatus. Bilateral testes are descended intrascrotally and normal to palpation. On rectal examination, there is normal sphincter tone. The prostate is clinically benign without focal induration or nodularity. \par \par Post-void residual on bladder scan today was 63 cc. \par \par Assessment: BPH, stable symptoms with Proscar and Flomax BID. Hematuria, resolved. Renal stones. Bladder stone. Urinary retention, resolved. \par \par I counseled the patient. He is doing well. In terms of his BPH, his symptoms are stable with Proscar and Flomax BID. I encouraged the patient to continue medications regularly as directed. I recommend the patient also obtain urine culture and urinalysis to ensure stability. I encouraged the patient to consider prostate surgery.  I counseled the patient regarding the procedure. The risks and benefits were discussed. Alternatives were given. I answered the patient questions. The patient will consider the procedure. Risks and alternatives were discussed. I answered the patient questions. The patient will follow-up as directed and will contact me with any questions or concerns. Thank you for the opportunity to participate in the care of Mr. DONALD. I will keep you updated on his progress.\par \par Plan: Continue Flomax BID and Proscar. Urine culture. Urinalysis. Consider prostate surgery. Follow up as directed.

## 2020-09-16 LAB
APPEARANCE: CLEAR
BACTERIA: NEGATIVE
BILIRUBIN URINE: NEGATIVE
BLOOD URINE: NORMAL
COLOR: NORMAL
GLUCOSE QUALITATIVE U: NORMAL
HYALINE CASTS: 0 /LPF
KETONES URINE: NEGATIVE
LEUKOCYTE ESTERASE URINE: NEGATIVE
MICROSCOPIC-UA: NORMAL
NITRITE URINE: POSITIVE
PH URINE: 6
PROTEIN URINE: NORMAL
RED BLOOD CELLS URINE: 3 /HPF
SPECIFIC GRAVITY URINE: 1.03
SQUAMOUS EPITHELIAL CELLS: 1 /HPF
UROBILINOGEN URINE: NORMAL
WHITE BLOOD CELLS URINE: 2 /HPF

## 2020-09-20 LAB — BACTERIA UR CULT: NORMAL
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
## 2020-10-28 ENCOUNTER — NON-APPOINTMENT (OUTPATIENT)
Age: 80
End: 2020-10-28

## 2020-10-28 ENCOUNTER — APPOINTMENT (OUTPATIENT)
Dept: CARDIOLOGY | Facility: CLINIC | Age: 80
End: 2020-10-28
Payer: MEDICARE

## 2020-10-28 VITALS
OXYGEN SATURATION: 96 % | DIASTOLIC BLOOD PRESSURE: 78 MMHG | HEART RATE: 89 BPM | RESPIRATION RATE: 17 BRPM | TEMPERATURE: 98.1 F | WEIGHT: 135 LBS | SYSTOLIC BLOOD PRESSURE: 153 MMHG | BODY MASS INDEX: 20.53 KG/M2

## 2020-10-28 DIAGNOSIS — R06.02 SHORTNESS OF BREATH: ICD-10-CM

## 2020-10-28 PROCEDURE — 99072 ADDL SUPL MATRL&STAF TM PHE: CPT

## 2020-10-28 PROCEDURE — 99214 OFFICE O/P EST MOD 30 MIN: CPT | Mod: 25

## 2020-10-28 PROCEDURE — 93015 CV STRESS TEST SUPVJ I&R: CPT

## 2020-10-28 PROCEDURE — ZZZZZ: CPT

## 2020-10-28 PROCEDURE — 93306 TTE W/DOPPLER COMPLETE: CPT

## 2020-10-28 PROCEDURE — 93000 ELECTROCARDIOGRAM COMPLETE: CPT | Mod: 59

## 2020-10-29 NOTE — REASON FOR VISIT
[Follow-Up - Clinic] : a clinic follow-up of [Hypertension] : hypertension [FreeTextEntry1] : 10/28/20 - Patient reports an episode of slow HR few months ago, but he felt okay. Patient denies CP, SOB, palpitations. Patient reports occasional dizziness. Patient reports home BP between 120-130. Patient is on Losartan 75 mg. I advised patient to undergo an echocardiogram and a treadmill stress test. \par \par \par 8/23/19 - Patient has chronic BPH and bladder stone and reports he underwent MRI due to hematuria. Patient reports that for the past year after he had US or CT scan he had problem with urinary retention and needed [place] Catheter. Patient will be undergoing laser treatment for BPH. He is on Losartan 75 mg and his BP is slightly elevated. We will adjust medication after his surgery.  \par \par \par

## 2020-10-29 NOTE — HISTORY OF PRESENT ILLNESS
[FreeTextEntry1] : 80-year-old male with HTN and history of abnormal stress test presents for followup. \par \par He was last seen on 8/23/19.  He was on Losartan 75 mg for HTN.\par \par Last echo was on 5/29/19 and it showed normal LV function without significant valvular pathology. \par \par Last stress was on 5/29/29 and he completed 6 minutes of Giovanni protocol.  There were upsloping ST depressions on ECG but no symptoms.  Following treadmill stress, there was no echocardiographic evidence of ischemia.

## 2020-10-29 NOTE — PHYSICAL EXAM
[General Appearance - Well Developed] : well developed [Normal Appearance] : normal appearance [Well Groomed] : well groomed [General Appearance - Well Nourished] : well nourished [No Deformities] : no deformities [General Appearance - In No Acute Distress] : no acute distress [Normal Conjunctiva] : the conjunctiva exhibited no abnormalities [Eyelids - No Xanthelasma] : the eyelids demonstrated no xanthelasmas [No Oral Pallor] : no oral pallor [No Oral Cyanosis] : no oral cyanosis [Normal Jugular Venous A Waves Present] : normal jugular venous A waves present [Normal Jugular Venous V Waves Present] : normal jugular venous V waves present [No Jugular Venous Maynard A Waves] : no jugular venous maynard A waves [Respiration, Rhythm And Depth] : normal respiratory rhythm and effort [Exaggerated Use Of Accessory Muscles For Inspiration] : no accessory muscle use [Auscultation Breath Sounds / Voice Sounds] : lungs were clear to auscultation bilaterally [Heart Rate And Rhythm] : heart rate and rhythm were normal [Heart Sounds] : normal S1 and S2 [Murmurs] : no murmurs present [Arterial Pulses Normal] : the arterial pulses were normal [Abdomen Soft] : soft [Abdomen Tenderness] : non-tender [Abdomen Mass (___ Cm)] : no abdominal mass palpated [Abnormal Walk] : normal gait [Nail Clubbing] : no clubbing of the fingernails [Cyanosis, Localized] : no localized cyanosis [Petechial Hemorrhages (___cm)] : no petechial hemorrhages [] : no ischemic changes [Oriented To Time, Place, And Person] : oriented to person, place, and time [Affect] : the affect was normal [Mood] : the mood was normal [No Anxiety] : not feeling anxious [FreeTextEntry1] : Trace-1+ edema noted.

## 2020-12-15 ENCOUNTER — APPOINTMENT (OUTPATIENT)
Dept: UROLOGY | Facility: CLINIC | Age: 80
End: 2020-12-15
Payer: MEDICARE

## 2020-12-15 VITALS
SYSTOLIC BLOOD PRESSURE: 148 MMHG | DIASTOLIC BLOOD PRESSURE: 81 MMHG | HEART RATE: 79 BPM | WEIGHT: 136 LBS | TEMPERATURE: 97.8 F | OXYGEN SATURATION: 95 % | RESPIRATION RATE: 17 BRPM | BODY MASS INDEX: 20.68 KG/M2

## 2020-12-15 DIAGNOSIS — Z00.00 ENCOUNTER FOR GENERAL ADULT MEDICAL EXAMINATION W/OUT ABNORMAL FINDINGS: ICD-10-CM

## 2020-12-15 PROCEDURE — 99214 OFFICE O/P EST MOD 30 MIN: CPT | Mod: 25

## 2020-12-15 PROCEDURE — 99072 ADDL SUPL MATRL&STAF TM PHE: CPT

## 2020-12-15 PROCEDURE — 51798 US URINE CAPACITY MEASURE: CPT

## 2020-12-15 RX ORDER — UNDERPADS 23" X 36"
EACH MISCELLANEOUS
Qty: 1 | Refills: 0 | Status: ACTIVE | OUTPATIENT
Start: 2019-08-20

## 2020-12-16 LAB
APPEARANCE: CLEAR
BACTERIA: NEGATIVE
BILIRUBIN URINE: NEGATIVE
BLOOD URINE: NORMAL
COLOR: YELLOW
GLUCOSE QUALITATIVE U: NEGATIVE
HYALINE CASTS: 0 /LPF
KETONES URINE: NEGATIVE
LEUKOCYTE ESTERASE URINE: NEGATIVE
MICROSCOPIC-UA: NORMAL
NITRITE URINE: NEGATIVE
PH URINE: 5.5
PROTEIN URINE: NORMAL
RED BLOOD CELLS URINE: 5 /HPF
SPECIFIC GRAVITY URINE: 1.03
SQUAMOUS EPITHELIAL CELLS: 1 /HPF
UROBILINOGEN URINE: NORMAL
WHITE BLOOD CELLS URINE: 3 /HPF

## 2020-12-18 LAB — BACTERIA UR CULT: ABNORMAL

## 2021-03-16 ENCOUNTER — APPOINTMENT (OUTPATIENT)
Dept: UROLOGY | Facility: CLINIC | Age: 81
End: 2021-03-16
Payer: MEDICARE

## 2021-03-16 VITALS
RESPIRATION RATE: 17 BRPM | TEMPERATURE: 98 F | SYSTOLIC BLOOD PRESSURE: 154 MMHG | HEART RATE: 79 BPM | BODY MASS INDEX: 20.68 KG/M2 | DIASTOLIC BLOOD PRESSURE: 76 MMHG | OXYGEN SATURATION: 95 % | WEIGHT: 136 LBS

## 2021-03-16 DIAGNOSIS — R94.39 ABNORMAL RESULT OF OTHER CARDIOVASCULAR FUNCTION STUDY: ICD-10-CM

## 2021-03-16 PROCEDURE — 99072 ADDL SUPL MATRL&STAF TM PHE: CPT

## 2021-03-16 PROCEDURE — 99214 OFFICE O/P EST MOD 30 MIN: CPT

## 2021-03-16 PROCEDURE — 76775 US EXAM ABDO BACK WALL LIM: CPT

## 2021-03-16 NOTE — LETTER BODY
[FreeTextEntry1] : Deepa Ch MD\par [address] [facility]\[place] [address]\par [phone]\par [phone]\par \par Dear Dr. Ch,\par \par Reason for visit: BPH. Bilateral renal cysts. Bladder stone.\par \par This is a 81 year-old gentleman with cataracts, previous gross hematuria, and previous urinary retention, presenting with a bladder stone, bilateral renal cysts, and chronic BPH. His CT scan in 2017 demonstrated an enlarged prostate, small renal cysts, and a bladder stone. His cystoscopy in 2017 was unremarkable. His MRI in 2019 demonstrated bilateral renal cysts and a left bladder stone. The patient returns today for follow-up. Since his last visit, the patient reports taking Flomax BID and Proscar regularly without any side effects or difficulties. He notes stable urine flow and stable urinary symptoms on medical therapy. He denies any hematuria, dysuria, or urinary incontinence. The patient denies any changes in health. He denies any pain currently. The past medical history and family history and social history are unchanged. All other review of systems are negative. Patient denies any changes in medications. Medication list was reconciled. He is allergic to Penicillins.\par \par On examination, the patient is an older-appearing gentleman in no acute distress. He is alert and oriented follows commands. He has normal mood and affect. He is normocephalic. Oral no thrush. Neck is supple. Respirations are unlabored. His abdomen is soft and nontender. Liver is nonpalpable. Bladder is nonpalpable. No CVA tenderness. Neurologically he is grossly intact. No peripheral edema. Skin without gross abnormality. He has normal male external genitalia. Normal meatus. Bilateral testes are descended intrascrotally and normal to palpation. On rectal examination, there is normal sphincter tone. The prostate is clinically benign without focal induration or nodularity.\par \par His urinalysis in Dec 2020 demonstrated evidence of microscopic hematuria, 5 RBC/HPF. His urine culture was positive for Coag negative Staphylococcus.\par \par Assessment: BPH, stable symptoms with Flomax BID and Proscar. Bilateral renal cysts. Bladder stone.\par \par I counseled the patient. In terms of his BPH, his symptoms are stable on medical therapy. I recommended he continue taking Flomax BID and Proscar. I renewed the patient's prescription for Flomax BID and Proscar today. I encouraged the patient to continue medications regularly as directed. In terms of his bilateral renal cysts and bladder stone, I recommended the patient undergo a renal and bladder ultrasound today. He will repeat BMP and PSA today to ensure stability. The patient will also obtain urinalysis today. Risks and alternatives were discussed. I answered the patient questions. The patient will follow-up in 3 months and will contact me with any questions or concerns. Thank you for the opportunity to participate in the care of Mr. DONALD. I will keep you updated on his progress.\par \par Plan: Continue Flomax BID and Proscar. Bladder ultrasound. Renal ultrasound. BMP. PSA. Urinalysis. Follow-up in 3 months.\par \par I personally reviewed ultrasound images with the patient today and images demonstrated nonvascular cysts with calcifications in both kidneys 1.0 cm in the left mid pole and 0.7 cm in the right mid pole. Both kidneys are normal in size and echogenicity without hydronephrosis, intrarenal stones or solid masses present. Under distended bladder with mobile stone sand visualized within measured up to 5.3 mm.

## 2021-03-17 LAB
ANION GAP SERPL CALC-SCNC: 10 MMOL/L
APPEARANCE: CLEAR
BACTERIA: NEGATIVE
BILIRUBIN URINE: NEGATIVE
BLOOD URINE: NEGATIVE
BUN SERPL-MCNC: 25 MG/DL
CALCIUM SERPL-MCNC: 9.7 MG/DL
CHLORIDE SERPL-SCNC: 104 MMOL/L
CO2 SERPL-SCNC: 27 MMOL/L
COLOR: YELLOW
CREAT SERPL-MCNC: 0.94 MG/DL
GLUCOSE QUALITATIVE U: NEGATIVE
GLUCOSE SERPL-MCNC: 90 MG/DL
HYALINE CASTS: 1 /LPF
KETONES URINE: NEGATIVE
LEUKOCYTE ESTERASE URINE: NEGATIVE
MICROSCOPIC-UA: NORMAL
NITRITE URINE: POSITIVE
PH URINE: 7
POTASSIUM SERPL-SCNC: 4 MMOL/L
PROTEIN URINE: NORMAL
PSA FREE FLD-MCNC: 27 %
PSA FREE SERPL-MCNC: 0.71 NG/ML
PSA SERPL-MCNC: 2.62 NG/ML
RED BLOOD CELLS URINE: 5 /HPF
SODIUM SERPL-SCNC: 142 MMOL/L
SPECIFIC GRAVITY URINE: 1.03
SQUAMOUS EPITHELIAL CELLS: 1 /HPF
UROBILINOGEN URINE: NORMAL
WHITE BLOOD CELLS URINE: 3 /HPF

## 2021-06-18 ENCOUNTER — APPOINTMENT (OUTPATIENT)
Dept: UROLOGY | Facility: CLINIC | Age: 81
End: 2021-06-18
Payer: MEDICARE

## 2021-06-18 VITALS
WEIGHT: 132 LBS | HEART RATE: 77 BPM | BODY MASS INDEX: 20.07 KG/M2 | OXYGEN SATURATION: 97 % | TEMPERATURE: 98.1 F | SYSTOLIC BLOOD PRESSURE: 135 MMHG | RESPIRATION RATE: 18 BRPM | DIASTOLIC BLOOD PRESSURE: 70 MMHG

## 2021-06-18 DIAGNOSIS — N39.41 URGE INCONTINENCE: ICD-10-CM

## 2021-06-18 PROCEDURE — 99072 ADDL SUPL MATRL&STAF TM PHE: CPT

## 2021-06-18 PROCEDURE — 99214 OFFICE O/P EST MOD 30 MIN: CPT

## 2021-06-18 PROCEDURE — 51798 US URINE CAPACITY MEASURE: CPT

## 2021-06-18 NOTE — HISTORY OF PRESENT ILLNESS
[FreeTextEntry1] : Please refer to URO Consult note \par \par fu bph \par pvr 66 \par prog sym \par flomax and proscar \par fu in 6 months \par

## 2021-06-18 NOTE — LETTER BODY
[FreeTextEntry1] : Deepa Ch MD\par [address] [facility]\[place] [address]\par [phone]\par [phone]\par \par Dear Dr. Ch,\par \par Reason for visit: BPH. Bilateral renal cysts. Bladder stone.\par \par This is a 81 year-old gentleman with cataracts, previous gross hematuria, and previous urinary retention, presenting with a bladder stone, bilateral renal cysts, and chronic BPH. His CT scan in 2017 demonstrated an enlarged prostate, small renal cysts, and a bladder stone. His cystoscopy in 2017 was unremarkable. His MRI in 2019 demonstrated bilateral renal cysts and a left bladder stone. His recent ultrasound confirmed nonvascular cysts in kidneys and a bladder stone. The patient returns today for follow-up. Since his last visit, the patient reports taking Flomax BID and Proscar regularly without any side effects or difficulties. He notes progressive urinary symptoms on medical therapy. He denies any hematuria, dysuria, or urinary incontinence. The patient denies any changes in health. He denies any pain currently. The past medical history and family history and social history are unchanged. All other review of systems are negative. Patient denies any changes in medications. Medication list was reconciled. He is allergic to Penicillins.\par \par On examination, the patient is an older-appearing gentleman in no acute distress. He is alert and oriented follows commands. He has normal mood and affect. He is normocephalic. Oral no thrush. Neck is supple. Respirations are unlabored. His abdomen is soft and nontender. Liver is nonpalpable. Bladder is nonpalpable. No CVA tenderness. Neurologically he is grossly intact. No peripheral edema. Skin without gross abnormality. He has normal male external genitalia. Normal meatus. Bilateral testes are descended intrascrotally and normal to palpation. On rectal examination, there is normal sphincter tone. The prostate is clinically benign without focal induration or nodularity.\par \par His previous  ultrasound demonstrated nonvascular cysts in kidneys and a bladder stone \par \par His BMP demonstrated normal renal functions, creatinine 0.94. His PSA was 2.62, which is within normal limits. His urinalysis was unremarkable. His urine culture was negative. His urinalysis demonstrated evidence of microscopic hematuria, 5 RBC/HPF. \par \par Assessment: BPH, stable symptoms with Flomax BID and Proscar. Bilateral renal cysts. Bladder stone. Microscopic hematuria. \par \par I counseled the patient. His PVR today was 66 cc. In terms of his BPH, he has progressive symptoms on medical therapy. I recommended he continue taking Flomax BID and Proscar. I renewed the patient's prescription for Flomax BID and Proscar today. I encouraged the patient to continue medications regularly as directed.He will repeat BMP and PSA today to ensure stability. In terms of his bilateral renal cysts and bladder stone, his recent ultrasound was stable.  In terms of his microscopic hematuria, the patient is asymptomatic. He will also obtain urinalysis today. Risks and alternatives were discussed. I answered the patient questions. The patient will follow-up in 6 months and will contact me with any questions or concerns. Thank you for the opportunity to participate in the care of Mr. DONALD. I will keep you updated on his progress.\par \par Plan: Continue Flomax BID and Proscar. BMP. PSA. Urinalysis. Follow-up in 6 months.

## 2021-06-18 NOTE — ADDENDUM
[FreeTextEntry1] : Entered by Haylie Lucio, acting as scribe for Dr. Hemal Edouard.\par \par The documentation recorded by the scribe accurately reflects the service I personally performed and the decisions made by me.

## 2021-06-19 LAB
APPEARANCE: CLEAR
BACTERIA: NEGATIVE
BILIRUBIN URINE: NEGATIVE
BLOOD URINE: NEGATIVE
COLOR: YELLOW
GLUCOSE QUALITATIVE U: NEGATIVE
HYALINE CASTS: 0 /LPF
KETONES URINE: NEGATIVE
LEUKOCYTE ESTERASE URINE: NEGATIVE
MICROSCOPIC-UA: NORMAL
NITRITE URINE: POSITIVE
PH URINE: 6.5
PROTEIN URINE: NORMAL
RED BLOOD CELLS URINE: 3 /HPF
SPECIFIC GRAVITY URINE: 1.03
SQUAMOUS EPITHELIAL CELLS: 1 /HPF
UROBILINOGEN URINE: NORMAL
WHITE BLOOD CELLS URINE: 2 /HPF

## 2021-06-28 NOTE — CARDIOLOGY SUMMARY
NAME: Stephanie Szymanski is a 21 y o  female  : 2000    MRN: [id number]      Assessment and Plan   Mild intermittent asthma with acute exacerbation [J45 21]  1  Mild intermittent asthma with acute exacerbation  predniSONE 10 mg tablet       Discussed with patient that her exam is completely normal and lungs are clear  She states usually at nighttime after being outside is when her symptoms worsen  Also just used nebulizer prior to coming here  Discussed trial of prednisone and antihistamine and if no improvement to follow-up with PCP  ER if anything changes or worsens  She acknowledges      Patient Instructions     Patient Instructions     Asthma   WHAT YOU NEED TO KNOW:   Asthma is a lung disease that makes breathing difficult  Chronic inflammation and reactions to triggers narrow the airways in the lungs  Asthma can become life-threatening if it is not managed  DISCHARGE INSTRUCTIONS:   Call your local emergency number (911 in the [address] [facility]) if:   · You have severe shortness of breath  · Your lips or nails turn blue or gray  · The skin around your neck and ribs pulls in with each breath  · You have shortness of breath, even after you take your short-term medicine as directed  · Your peak flow numbers are in the red zone of your AAP  Call your doctor if:   · You run out of medicine before your next refill is due  · Your symptoms get worse  · You need to take more medicine than usual to control your symptoms  · You have questions or concerns about your condition or care  Medicines:   · Medicines  decrease inflammation, open airways, and make it easier to breathe  Medicines may be inhaled, taken as a pill, or injected  Short-term medicines relieve your symptoms quickly  Long-term medicines are used to prevent future attacks  You may also need medicine to help control your allergies   Ask your healthcare provider for more information about the medicine you are given and how to take it [___] : [unfilled] safely  · Take your medicine as directed  Contact your healthcare provider if you think your medicine is not helping or if you have side effects  Tell him of her if you are allergic to any medicine  Keep a list of the medicines, vitamins, and herbs you take  Include the amounts, and when and why you take them  Bring the list or the pill bottles to follow-up visits  Carry your medicine list with you in case of an emergency  Follow up with your healthcare provider as directed: You will need to return to make sure your medicine is working and your symptoms are controlled  You may be referred to an asthma specialist  Gerry Tommy may be asked to keep a record of your peak flow values and bring it with you to your appointments  Write down your questions so you remember to ask them during your visits  Manage your symptoms and prevent future attacks:   · Follow your Asthma Action Plan (AAP)  This is a written plan that you and your healthcare provider create  It explains which medicine you need and when to change doses if necessary  It also explains how you can monitor symptoms and use a peak flow meter  The meter measures how well your lungs are working  · Manage other health conditions , such as allergies, acid reflux, and sleep apnea  · Identify and avoid triggers  These may include pets, dust mites, mold, and cockroaches  · Do not smoke or be around others who smoke  Nicotine and other chemicals in cigarettes and cigars can cause lung damage  Ask your healthcare provider for information if you currently smoke and need help to quit  E-cigarettes or smokeless tobacco still contain nicotine  Talk to your healthcare provider before you use these products  · Ask about the flu vaccine  The flu can make your asthma worse  You may need a yearly flu shot  © Copyright [address] Information is for End User's use only and may not be sold, redistributed or otherwise used for commercial purposes   All illustrations and images included in CareNotes® are the copyrighted property of A D A M , Inc  or [facility] Dwight Valderrama   The above information is an  only  It is not intended as medical advice for individual conditions or treatments  Talk to your doctor, nurse or pharmacist before following any medical regimen to see if it is safe and effective for you  Proceed to ER if symptoms worsen  Chief Complaint     Chief Complaint   Patient presents with    Cough     pt  states she's had a cough for about 1 month  Pt  states that it started out as a head cold but that the cough never went away  Pt  does have asthma  pt  had been using the rescue inhaler as neded and just started the nebulizer 2 days ago 4x per day  History of Present Illness   Patient with history of mild intermittent asthma presents complaining of an asthma exacerbation  Reports about a month ago she was sick with a head cold  Reports all of the symptoms had resolved within 2 weeks apartment a cough which has lingered  States the cough is dry and intermittent  Reports in the past couple of days she has required some nebulizer treatments  Reports chest tightness  And getting "winded" on exertion but denies any chest pain, palpitations, shortness of breath or dyspnea  Denies any fevers, chills, body aches, nausea or vomiting or diarrhea  Has been using her nebulizer and inhaler which helped with her symptoms  Denies any history of hospitalization for her asthma  Review of Systems   Review of Systems   Constitutional: Negative for chills, fatigue and fever  Respiratory: Positive for cough and chest tightness  Negative for shortness of breath, wheezing and stridor  Cardiovascular: Negative for chest pain and palpitations  Gastrointestinal: Negative for diarrhea, nausea and vomiting  Musculoskeletal: Negative for myalgias  Neurological: Negative for dizziness, light-headedness and headaches           Current Medications       Current Outpatient Medications:     albuterol (PROVENTIL HFA,VENTOLIN HFA) 90 mcg/act inhaler, Inhale 2 puffs every 6 (six) hours as needed for wheezing, Disp: , Rfl:     clindamycin (CLEOCIN T) 1 % lotion, , Disp: , Rfl:     fluticasone (FLONASE) 50 mcg/act nasal spray, 2 sprays into each nostril daily as needed , Disp: , Rfl:     HYDROcodone-acetaminophen (NORCO) 5-325 mg per tablet, , Disp: , Rfl:     ibuprofen (MOTRIN) 600 mg tablet, Take 1 tablet (600 mg total) by mouth every 6 (six) hours as needed for mild pain, Disp: 30 tablet, Rfl: 0    methocarbamol (ROBAXIN) 500 mg tablet, Take 1 tablet (500 mg total) by mouth daily at bedtime as needed for muscle spasms, Disp: 15 tablet, Rfl: 0    montelukast (SINGULAIR) 10 mg tablet, Take 10 mg by mouth daily at bedtime, Disp: , Rfl:     norgestimate-ethinyl estradiol (Tri-Lo-Sprintec) 0 18/0 215/0 25 MG-25 MCG per tablet, Take 1 tablet by mouth daily, Disp: 84 tablet, Rfl: 0    oxyCODONE (ROXICODONE) 5 mg immediate release tablet, Take 1 tab for moderate pain  Take 2 tabs for severe pain   (Patient not taking: Reported on 11/10/2020), Disp: 20 tablet, Rfl: 0    pantoprazole (PROTONIX) 40 mg tablet, Take 1 tablet (40 mg total) by mouth daily in the early morning, Disp: 30 tablet, Rfl: 1    penicillin V potassium (VEETID) 500 mg tablet, , Disp: , Rfl:     phenazopyridine (PYRIDIUM) 100 mg tablet, Take 1 tablet (100 mg total) by mouth 3 (three) times a day as needed for bladder spasms (Patient not taking: Reported on 11/10/2020), Disp: 10 tablet, Rfl: 0    predniSONE 10 mg tablet, Take 6 tablets today, 5 tablets tomorrow, 4 the next day, 3 the next day, 2 the following and 1 the last day- all with food, Disp: 21 tablet, Rfl: 0    spironolactone (ALDACTONE) 25 mg tablet, Take 25 mg by mouth daily, Disp: , Rfl:     Current Allergies     Allergies as of 06/28/2021 - Reviewed 06/28/2021   Allergen Reaction Noted    Oxycodone Hives 11/01/2020 Past Medical History:   Diagnosis Date    Acne     Allergic     Asthma     Left buttock abscess 10/06/2017    Neoplasm of uncertain behavior 93/65/2042    of skin       Past Surgical History:   Procedure Laterality Date    ABSCESS DRAINAGE Left 10/06/2017    I & D OF LEFT BUTTOCK ABSCESS IN OFFICE-ANDREA HARRELL PA-C    NE KNEE SCOPE,MED/LAT MENISECTOMY Right 2/1/2019    Procedure: ARTHROSCOPY KNEE, RIGHT RESECTION OF ANTERIOMEDIAL PLICA;  Surgeon: Chavez Young MD;  Location:  MAIN OR;  Service: Orthopedics       Family History   Problem Relation Age of Onset    Heart disease Father     Hyperlipidemia Father     BRUNO disease Father     Asthma Mother     Hyperlipidemia Mother     BRUNO disease Mother     Diabetes Maternal Grandmother     Heart disease Maternal Grandmother     Hyperlipidemia Maternal Grandmother     Heart disease Maternal Grandfather     Hyperlipidemia Maternal Grandfather     Heart disease Paternal Grandmother     Drug abuse Neg Hx     Mental illness Neg Hx          Medications have been verified  The following portions of the patient's history were reviewed and updated as appropriate: allergies, current medications, past family history, past medical history, past social history, past surgical history and problem list     Objective   /70 (BP Location: Right arm, Patient Position: Sitting, Cuff Size: Standard)   Pulse 82   Temp 98 2 °F (36 8 °C)   Resp 16   Ht 5' 6" (1 676 m)   Wt 72 6 kg (160 lb)   SpO2 97%   BMI 25 82 kg/m²      Physical Exam     Physical Exam  Vitals and nursing note reviewed  Constitutional:       General: She is not in acute distress  Appearance: Normal appearance  She is not ill-appearing, toxic-appearing or diaphoretic  Cardiovascular:      Rate and Rhythm: Normal rate and regular rhythm  Heart sounds: Normal heart sounds  No murmur heard       Pulmonary:      Effort: Pulmonary effort is normal  No respiratory distress  Breath sounds: Normal breath sounds  No stridor  No wheezing, rhonchi or rales  Neurological:      Mental Status: She is alert and oriented to person, place, and time

## 2021-07-15 ENCOUNTER — NON-APPOINTMENT (OUTPATIENT)
Age: 81
End: 2021-07-15

## 2021-07-16 ENCOUNTER — APPOINTMENT (OUTPATIENT)
Dept: UROLOGY | Facility: CLINIC | Age: 81
End: 2021-07-16
Payer: MEDICARE

## 2021-07-16 VITALS
SYSTOLIC BLOOD PRESSURE: 152 MMHG | HEART RATE: 81 BPM | WEIGHT: 133 LBS | RESPIRATION RATE: 16 BRPM | OXYGEN SATURATION: 94 % | BODY MASS INDEX: 20.22 KG/M2 | DIASTOLIC BLOOD PRESSURE: 73 MMHG | TEMPERATURE: 97.9 F

## 2021-07-16 DIAGNOSIS — R07.89 OTHER CHEST PAIN: ICD-10-CM

## 2021-07-16 DIAGNOSIS — K59.00 CONSTIPATION, UNSPECIFIED: ICD-10-CM

## 2021-07-16 PROCEDURE — 51798 US URINE CAPACITY MEASURE: CPT

## 2021-07-16 PROCEDURE — 99072 ADDL SUPL MATRL&STAF TM PHE: CPT

## 2021-07-16 PROCEDURE — 99214 OFFICE O/P EST MOD 30 MIN: CPT

## 2021-07-19 LAB
APPEARANCE: CLEAR
BACTERIA UR CULT: NORMAL
BACTERIA: NEGATIVE
BILIRUBIN URINE: NEGATIVE
BLOOD URINE: ABNORMAL
COLOR: YELLOW
GLUCOSE QUALITATIVE U: NEGATIVE
HYALINE CASTS: 0 /LPF
KETONES URINE: NEGATIVE
LEUKOCYTE ESTERASE URINE: NEGATIVE
MICROSCOPIC-UA: NORMAL
NITRITE URINE: POSITIVE
PH URINE: 6
PROTEIN URINE: NORMAL
RED BLOOD CELLS URINE: 3 /HPF
SPECIFIC GRAVITY URINE: 1.03
SQUAMOUS EPITHELIAL CELLS: 0 /HPF
UROBILINOGEN URINE: NORMAL
WHITE BLOOD CELLS URINE: 2 /HPF

## 2021-07-29 NOTE — LETTER BODY
[FreeTextEntry1] : Deepa Ch MD\par [address] [facility]\[place] [address]\par [phone]\par [phone]\par \par Dear Dr. Ch,\par \par Reason for visit: BPH. Bilateral renal cysts. Bladder stone.\par \par This is a 81 year-old gentleman with cataracts, previous gross hematuria, and previous urinary retention, presenting with a bladder stone, bilateral renal cysts, and chronic BPH. His CT scan in 2017 demonstrated an enlarged prostate, small renal cysts, and a bladder stone. His cystoscopy in 2017 was unremarkable. His MRI in 2019 demonstrated bilateral renal cysts and a left bladder stone. His ultrasound in March confirmed nonvascular cysts in kidneys and a bladder stone. The patient returns today for follow-up. Since his last visit, the patient reports taking Flomax BID and Proscar regularly without any side effects or difficulties. He notes progressive urinary symptoms on medical therapy. He denies any hematuria, dysuria, or urinary incontinence. The patient denies any changes in health. He denies any pain currently. The past medical history and family history and social history are unchanged. All other review of systems are negative. Patient denies any changes in medications. Medication list was reconciled. He is allergic to Penicillins.\par \par On examination, the patient is an older-appearing gentleman in no acute distress. He is alert and oriented follows commands. He has normal mood and affect. He is normocephalic. Oral no thrush. Neck is supple. Respirations are unlabored. His abdomen is soft and nontender. Liver is nonpalpable. Bladder is nonpalpable. No CVA tenderness. Neurologically he is grossly intact. No peripheral edema. Skin without gross abnormality. He has normal male external genitalia. Normal meatus. Bilateral testes are descended intrascrotally and normal to palpation. On rectal examination, there is normal sphincter tone. The prostate is clinically benign without focal induration or nodularity.\par \par His BMP from March 2021 demonstrated normal renal functions, creatinine 0.94. His PSA from March 2021 was 2.62, which is within normal limits. His urinalysis from June 2021 was unremarkable. \par \par Post-void residual on bladder scan today was 45 cc. \par \par Assessment: BPH, progressive symptoms with Flomax BID and Proscar. Bilateral renal cysts. Bladder stone. Microscopic hematuria. \par \par I counseled the patient. His PVR today was 45 cc. In terms of his BPH, he has progressive symptoms on medical therapy. I recommended he continue taking Flomax BID and Proscar. I renewed the patient's prescription for Flomax BID and Proscar today. I encouraged the patient to continue medications regularly as directed. Given his progressive symptoms, I recommended the patient consider surgical intervention, such as TURP. I counseled the patient regarding the procedure. The risks and benefits were discussed. Alternatives were given. I answered the patient questions. The patient will consider his options. He will repeat BMP and PSA today to ensure stability. In terms of his bilateral renal cysts and bladder stone, his recent ultrasound was stable. In terms of his microscopic hematuria, the patient's recent urinalysis was unremarkable. He will repeat urinalysis today. Risks and alternatives were discussed. I answered the patient questions. The patient will follow-up as directed and will contact me with any questions or concerns. Thank you for the opportunity to participate in the care of Mr. DONALD. I will keep you updated on his progress.\par \par Plan: Continue Flomax BID and Proscar. Urinalysis. Urine culture. Consider TURP. Follow-up as directed.

## 2021-07-29 NOTE — HISTORY OF PRESENT ILLNESS
[FreeTextEntry1] : Please refer to URO Consult note \par \par bph flomax and proscar \par pvr 45 no retention \par continue meds \par urine culture \par urinalysis \par consider TURP \par

## 2021-10-26 ENCOUNTER — APPOINTMENT (OUTPATIENT)
Dept: UROLOGY | Facility: CLINIC | Age: 81
End: 2021-10-26
Payer: MEDICARE

## 2021-10-26 VITALS
TEMPERATURE: 98.3 F | HEART RATE: 68 BPM | SYSTOLIC BLOOD PRESSURE: 157 MMHG | OXYGEN SATURATION: 96 % | DIASTOLIC BLOOD PRESSURE: 75 MMHG | WEIGHT: 138 LBS | BODY MASS INDEX: 20.98 KG/M2 | RESPIRATION RATE: 18 BRPM

## 2021-10-26 DIAGNOSIS — R97.20 ELEVATED PROSTATE, SPECIFIC ANTIGEN [PSA]: ICD-10-CM

## 2021-10-26 DIAGNOSIS — N40.1 BENIGN PROSTATIC HYPERPLASIA WITH LOWER URINARY TRACT SYMPMS: ICD-10-CM

## 2021-10-26 DIAGNOSIS — N13.8 BENIGN PROSTATIC HYPERPLASIA WITH LOWER URINARY TRACT SYMPMS: ICD-10-CM

## 2021-10-26 PROCEDURE — 99214 OFFICE O/P EST MOD 30 MIN: CPT

## 2021-10-26 PROCEDURE — 51798 US URINE CAPACITY MEASURE: CPT

## 2021-10-26 NOTE — LETTER BODY
[FreeTextEntry1] : Deepa Ch MD\par [address] [facility]\[place] [address]\par [phone]\par [phone]\par \par Dear Dr. Ch,\par \par Reason for visit: BPH. Bilateral renal cysts. Bladder stone.\par \par This is a 81 year-old gentleman with cataracts, previous gross hematuria, and previous urinary retention, presenting with a bladder stone, bilateral renal cysts, and chronic BPH. His CT scan in 2017 demonstrated an enlarged prostate, small renal cysts, and a bladder stone. His cystoscopy in 2017 was unremarkable. His MRI in 2019 demonstrated bilateral renal cysts and a left bladder stone. His ultrasound in March confirmed nonvascular cysts in kidneys and a bladder stone. The patient returns today for follow-up. Since his last visit, the patient reports taking Flomax BID and Proscar regularly without any side effects or difficulties. He notes progressive urinary symptoms on medical therapy. He denies any hematuria, dysuria, or urinary incontinence. The patient denies any changes in health. He denies any pain currently. The past medical history and family history and social history are unchanged. All other review of systems are negative. Patient denies any changes in medications. Medication list was reconciled. He is allergic to Penicillins.\par \par On examination, the patient is an older-appearing gentleman in no acute distress. He is alert and oriented follows commands. He has normal mood and affect. He is normocephalic. Oral no thrush. Neck is supple. Respirations are unlabored. His abdomen is soft and nontender. Liver is nonpalpable. Bladder is nonpalpable. No CVA tenderness. Neurologically he is grossly intact. No peripheral edema. Skin without gross abnormality. He has normal male external genitalia. Normal meatus. Bilateral testes are descended intrascrotally and normal to palpation. On rectal examination, there is normal sphincter tone. The prostate is clinically benign without focal induration or nodularity.\par \par His urinalysis was unremarkable. His urine culture was negative. \par \par Post-void residual on bladder scan today was 60 cc. \par \par Assessment: BPH, progressive symptoms with Flomax BID and Proscar. Bilateral renal cysts. Bladder stone. Microscopic hematuria. \par \par I counseled the patient. His PVR today was 60 cc. In terms of his BPH, he has progressive symptoms on medical therapy. Given his progressive symptoms, I recommended the patient consider surgical intervention, such as TURP. The patient will consider his options. I also recommended he continue taking Flomax BID and Proscar. I renewed the patient's prescription for Flomax BID and Proscar today. I encouraged the patient to continue medications regularly as directed. He will repeat BMP and PSA today to ensure stability.  In terms of his bilateral renal cysts and bladder stone, his recent ultrasound was stable. Risks and alternatives were discussed. I answered the patient questions. The patient will follow-up as directed and will contact me with any questions or concerns. Thank you for the opportunity to participate in the care of Mr. DONALD. I will keep you updated on his progress.\par \par Plan: Continue Flomax BID and Proscar. PSA. BMP.  Consider TURP. Follow-up as directed. Discharged

## 2021-10-27 LAB
ANION GAP SERPL CALC-SCNC: 14 MMOL/L
BUN SERPL-MCNC: 23 MG/DL
CALCIUM SERPL-MCNC: 9.5 MG/DL
CHLORIDE SERPL-SCNC: 103 MMOL/L
CO2 SERPL-SCNC: 24 MMOL/L
CREAT SERPL-MCNC: 1.01 MG/DL
GLUCOSE SERPL-MCNC: 110 MG/DL
POTASSIUM SERPL-SCNC: 4.5 MMOL/L
PSA FREE FLD-MCNC: 30 %
PSA FREE SERPL-MCNC: 0.87 NG/ML
PSA SERPL-MCNC: 2.86 NG/ML
SODIUM SERPL-SCNC: 141 MMOL/L

## 2022-02-04 NOTE — ED ADULT NURSE NOTE - DISCHARGE DATE/TIME
Patient sent over from Dr. Morrow's office to receive Methotrexate injection. Patient encouraged to stay as inpatient, but refuses. Requests to receive injection and leave. Monitored for adverse reaction for  20 minutes post injection. Advised patient to keep appointment with Dr Morrow on Monday and to go to ER with any adverse reactions or concerning symptoms. Patient expressed understanding.    06-Jun-2017 21:01

## 2022-10-25 ENCOUNTER — APPOINTMENT (OUTPATIENT)
Dept: UROLOGY | Facility: CLINIC | Age: 82
End: 2022-10-25

## 2025-02-24 NOTE — ED ADULT TRIAGE NOTE - CCCP TRG CHIEF CMPLNT
urinary/bladder trouble
[FreeTextEntry1] : Family history of hearing loss denied Medical history unremarkable